# Patient Record
Sex: FEMALE | Race: WHITE | Employment: UNEMPLOYED | URBAN - METROPOLITAN AREA
[De-identification: names, ages, dates, MRNs, and addresses within clinical notes are randomized per-mention and may not be internally consistent; named-entity substitution may affect disease eponyms.]

---

## 2017-10-20 ENCOUNTER — HOSPITAL ENCOUNTER (EMERGENCY)
Age: 82
Discharge: HOME | End: 2017-10-21
Payer: MEDICARE

## 2017-10-20 DIAGNOSIS — S43.014A ANTERIOR DISLOCATION OF RIGHT SHOULDER, INITIAL ENCOUNTER: Primary | ICD-10-CM

## 2017-10-20 PROCEDURE — 99283 EMERGENCY DEPT VISIT LOW MDM: CPT

## 2017-10-20 RX ORDER — HYDROCODONE BITARTRATE AND ACETAMINOPHEN 5; 325 MG/1; MG/1
1 TABLET ORAL ONCE
Status: COMPLETED | OUTPATIENT
Start: 2017-10-20 | End: 2017-10-21

## 2017-10-20 ASSESSMENT — PAIN SCALES - GENERAL: PAINLEVEL_OUTOF10: 7

## 2017-10-20 ASSESSMENT — PAIN DESCRIPTION - LOCATION: LOCATION: SHOULDER

## 2017-10-20 ASSESSMENT — PAIN DESCRIPTION - ONSET: ONSET: SUDDEN

## 2017-10-20 ASSESSMENT — PAIN DESCRIPTION - PAIN TYPE: TYPE: ACUTE PAIN

## 2017-10-20 ASSESSMENT — PAIN DESCRIPTION - ORIENTATION: ORIENTATION: RIGHT

## 2017-10-20 ASSESSMENT — PAIN DESCRIPTION - FREQUENCY: FREQUENCY: INTERMITTENT

## 2017-10-21 ENCOUNTER — APPOINTMENT (OUTPATIENT)
Dept: GENERAL RADIOLOGY | Age: 82
End: 2017-10-21
Payer: MEDICARE

## 2017-10-21 VITALS
HEIGHT: 62 IN | BODY MASS INDEX: 25.76 KG/M2 | OXYGEN SATURATION: 96 % | WEIGHT: 140 LBS | HEART RATE: 100 BPM | SYSTOLIC BLOOD PRESSURE: 140 MMHG | RESPIRATION RATE: 24 BRPM | DIASTOLIC BLOOD PRESSURE: 78 MMHG | TEMPERATURE: 98.2 F

## 2017-10-21 PROCEDURE — 2500000003 HC RX 250 WO HCPCS

## 2017-10-21 PROCEDURE — 73020 X-RAY EXAM OF SHOULDER: CPT

## 2017-10-21 PROCEDURE — 73030 X-RAY EXAM OF SHOULDER: CPT

## 2017-10-21 PROCEDURE — 6370000000 HC RX 637 (ALT 250 FOR IP): Performed by: PERSONAL EMERGENCY RESPONSE ATTENDANT

## 2017-10-21 PROCEDURE — 6360000002 HC RX W HCPCS: Performed by: PERSONAL EMERGENCY RESPONSE ATTENDANT

## 2017-10-21 PROCEDURE — 96374 THER/PROPH/DIAG INJ IV PUSH: CPT

## 2017-10-21 PROCEDURE — 2500000003 HC RX 250 WO HCPCS: Performed by: PERSONAL EMERGENCY RESPONSE ATTENDANT

## 2017-10-21 PROCEDURE — 96375 TX/PRO/DX INJ NEW DRUG ADDON: CPT

## 2017-10-21 PROCEDURE — 96376 TX/PRO/DX INJ SAME DRUG ADON: CPT

## 2017-10-21 RX ORDER — HYDROCODONE BITARTRATE AND ACETAMINOPHEN 5; 325 MG/1; MG/1
1 TABLET ORAL ONCE
Status: COMPLETED | OUTPATIENT
Start: 2017-10-21 | End: 2017-10-21

## 2017-10-21 RX ORDER — MORPHINE SULFATE 4 MG/ML
4 INJECTION, SOLUTION INTRAMUSCULAR; INTRAVENOUS ONCE
Status: COMPLETED | OUTPATIENT
Start: 2017-10-21 | End: 2017-10-21

## 2017-10-21 RX ORDER — MELOXICAM 7.5 MG/1
7.5 TABLET ORAL DAILY
Qty: 10 TABLET | Refills: 0 | Status: ON HOLD | OUTPATIENT
Start: 2017-10-21 | End: 2017-12-17 | Stop reason: HOSPADM

## 2017-10-21 RX ORDER — ONDANSETRON 2 MG/ML
4 INJECTION INTRAMUSCULAR; INTRAVENOUS ONCE
Status: COMPLETED | OUTPATIENT
Start: 2017-10-21 | End: 2017-10-21

## 2017-10-21 RX ORDER — ETOMIDATE 2 MG/ML
INJECTION INTRAVENOUS
Status: COMPLETED
Start: 2017-10-21 | End: 2017-10-21

## 2017-10-21 RX ORDER — MORPHINE SULFATE 2 MG/ML
2 INJECTION, SOLUTION INTRAMUSCULAR; INTRAVENOUS ONCE
Status: COMPLETED | OUTPATIENT
Start: 2017-10-21 | End: 2017-10-21

## 2017-10-21 RX ORDER — HYDROCODONE BITARTRATE AND ACETAMINOPHEN 5; 325 MG/1; MG/1
1 TABLET ORAL EVERY 6 HOURS PRN
Qty: 10 TABLET | Refills: 0 | Status: SHIPPED | OUTPATIENT
Start: 2017-10-21 | End: 2017-10-28

## 2017-10-21 RX ORDER — ETOMIDATE 2 MG/ML
10 INJECTION INTRAVENOUS ONCE
Status: COMPLETED | OUTPATIENT
Start: 2017-10-21 | End: 2017-10-21

## 2017-10-21 RX ADMIN — ONDANSETRON 4 MG: 2 INJECTION INTRAMUSCULAR; INTRAVENOUS at 03:26

## 2017-10-21 RX ADMIN — ETOMIDATE 8 MG: 2 INJECTION INTRAVENOUS at 02:25

## 2017-10-21 RX ADMIN — ETOMIDATE 10 MG: 2 INJECTION INTRAVENOUS at 02:30

## 2017-10-21 RX ADMIN — HYDROCODONE BITARTRATE AND ACETAMINOPHEN 1 TABLET: 5; 325 TABLET ORAL at 03:26

## 2017-10-21 RX ADMIN — HYDROCODONE BITARTRATE AND ACETAMINOPHEN 1 TABLET: 5; 325 TABLET ORAL at 00:01

## 2017-10-21 RX ADMIN — Medication 2 MG: at 03:28

## 2017-10-21 RX ADMIN — ONDANSETRON 4 MG: 2 INJECTION INTRAMUSCULAR; INTRAVENOUS at 01:30

## 2017-10-21 RX ADMIN — Medication 4 MG: at 01:30

## 2017-10-21 ASSESSMENT — ENCOUNTER SYMPTOMS
NAUSEA: 0
COUGH: 0
COLOR CHANGE: 0
VOMITING: 0
DIARRHEA: 0
ABDOMINAL PAIN: 0
RHINORRHEA: 0
SHORTNESS OF BREATH: 0
BLOOD IN STOOL: 0

## 2017-10-21 ASSESSMENT — PAIN SCALES - GENERAL: PAINLEVEL_OUTOF10: 7

## 2017-10-21 NOTE — ED NOTES
Patient taken out to private vehicle in wheel chair with 2 person assist. Tried to apply swing and swathe. Patient agitated and refusing to keep sling and swathe on. No respiratory distress noted.       Megan Pineda, RN  10/21/17 3370

## 2017-10-21 NOTE — ED PROVIDER NOTES
TIME   Total Critical Care time was 0 minutes, excluding separately reportable procedures. There was a high probability of clinically significant/life threatening deterioration in the patient's condition which required my urgent intervention. Procedures    FINAL IMPRESSION      1. Anterior dislocation of right shoulder, initial encounter          DISPOSITION/PLAN   DISPOSITION Discharge - Pending Orders Complete    PATIENT REFERRED TO:  Drea Irving MD  [address]   [address] [place] [address]  [phone]    In 2 days        DISCHARGE MEDICATIONS:  New Prescriptions    HYDROCODONE-ACETAMINOPHEN (NORCO) 5-325 MG PER TABLET    Take 1 tablet by mouth every 6 hours as needed for Pain . MELOXICAM (MOBIC) 7.5 MG TABLET    Take 1 tablet by mouth daily     Attestation: The Prescription Monitoring Report for this patient was reviewed today. (Odette Dickson, [address])    (Please note that portions of this note were completed with a voice recognition program.  Efforts were made to edit the dictations but occasionally words are mis-transcribed. )    FANG Correia (electronically signed)  Emergency Physician [address] [place] [address], [address]  10/21/17 5131

## 2017-10-21 NOTE — ED NOTES
Sling applied after conscious sedation performed. Patient continues to be restless after waking from sedation. Patient vitals continuing to be monitored.  Family at [address], RN  10/21/17 0062

## 2017-10-21 NOTE — ED TRIAGE NOTES
Pt has arthritis in her knees. She was getting ready to go to bed and fell I her bedroom. She denies hitting her head. Denies loc. States she could not get up herself so her  tried to help her, and thinks he may have injured her right shoulder.

## 2017-10-21 NOTE — ED NOTES
Patient placed on monitor, oxygen via 2lpm NC, pulse ox. Patient lying on her left side. Moaning in pain. Portable xray being obtained. Physician aware.       Beth Pineda RN  10/21/17 9195

## 2017-10-21 NOTE — ED NOTES
Patient placed on life michael, as well as monitor, 2L Oxygen via nasal cannula. Resp Therapy at bedside.       [address] K[address] Pineda, RN  10/21/17 0891

## 2017-11-18 ENCOUNTER — APPOINTMENT (OUTPATIENT)
Dept: GENERAL RADIOLOGY | Age: 82
End: 2017-11-18
Payer: MEDICARE

## 2017-11-18 ENCOUNTER — HOSPITAL ENCOUNTER (EMERGENCY)
Age: 82
Discharge: HOME | End: 2017-11-18
Payer: MEDICARE

## 2017-11-18 VITALS
HEART RATE: 69 BPM | BODY MASS INDEX: 24.16 KG/M2 | DIASTOLIC BLOOD PRESSURE: 54 MMHG | SYSTOLIC BLOOD PRESSURE: 138 MMHG | WEIGHT: 145 LBS | HEIGHT: 65 IN | OXYGEN SATURATION: 98 % | RESPIRATION RATE: 17 BRPM | TEMPERATURE: 98.1 F

## 2017-11-18 DIAGNOSIS — S22.43XD MULTIPLE CLOSED FRACTURES OF RIBS OF BOTH SIDES WITH ROUTINE HEALING, SUBSEQUENT ENCOUNTER: Primary | ICD-10-CM

## 2017-11-18 LAB
ALBUMIN SERPL-MCNC: 2.8 G/DL (ref 3.9–4.9)
ALP BLD-CCNC: 155 U/L (ref 40–130)
ALT SERPL-CCNC: 15 U/L (ref 0–33)
ANION GAP SERPL CALCULATED.3IONS-SCNC: 12 MEQ/L (ref 7–13)
APTT: 27.3 SEC (ref 21.6–35.4)
AST SERPL-CCNC: 30 U/L (ref 0–35)
BILIRUB SERPL-MCNC: 2 MG/DL (ref 0–1.2)
BUN BLDV-MCNC: 8 MG/DL (ref 8–23)
CALCIUM SERPL-MCNC: 8.7 MG/DL (ref 8.6–10.2)
CHLORIDE BLD-SCNC: 101 MEQ/L (ref 98–107)
CO2: 26 MEQ/L (ref 22–29)
CREAT SERPL-MCNC: 0.45 MG/DL (ref 0.5–0.9)
GFR AFRICAN AMERICAN: >60
GFR NON-AFRICAN AMERICAN: >60
GLOBULIN: 3 G/DL (ref 2.3–3.5)
GLUCOSE BLD-MCNC: 109 MG/DL (ref 74–109)
HCT VFR BLD CALC: 40.2 % (ref 37–47)
HEMOGLOBIN: 13.5 G/DL (ref 12–16)
INR BLD: 1.2
MCH RBC QN AUTO: 34.3 PG (ref 27–31.3)
MCHC RBC AUTO-ENTMCNC: 33.6 % (ref 33–37)
MCV RBC AUTO: 102.2 FL (ref 82–100)
PDW BLD-RTO: 16.5 % (ref 11.5–14.5)
PLATELET # BLD: 127 K/UL (ref 130–400)
POTASSIUM SERPL-SCNC: 3.8 MEQ/L (ref 3.5–5.1)
PRO-BNP: 713 PG/ML
PROTHROMBIN TIME: 12.7 SEC (ref 8.1–13.7)
RBC # BLD: 3.93 M/UL (ref 4.2–5.4)
SODIUM BLD-SCNC: 139 MEQ/L (ref 132–144)
TOTAL CK: 47 U/L (ref 0–170)
TOTAL PROTEIN: 5.8 G/DL (ref 6.4–8.1)
TROPONIN: <0.01 NG/ML (ref 0–0.01)
WBC # BLD: 4.9 K/UL (ref 4.8–10.8)

## 2017-11-18 PROCEDURE — 85610 PROTHROMBIN TIME: CPT

## 2017-11-18 PROCEDURE — 99285 EMERGENCY DEPT VISIT HI MDM: CPT

## 2017-11-18 PROCEDURE — 71111 X-RAY EXAM RIBS/CHEST4/> VWS: CPT

## 2017-11-18 PROCEDURE — 36415 COLL VENOUS BLD VENIPUNCTURE: CPT

## 2017-11-18 PROCEDURE — 85027 COMPLETE CBC AUTOMATED: CPT

## 2017-11-18 PROCEDURE — 83880 ASSAY OF NATRIURETIC PEPTIDE: CPT

## 2017-11-18 PROCEDURE — 84484 ASSAY OF TROPONIN QUANT: CPT

## 2017-11-18 PROCEDURE — 87040 BLOOD CULTURE FOR BACTERIA: CPT

## 2017-11-18 PROCEDURE — 82550 ASSAY OF CK (CPK): CPT

## 2017-11-18 PROCEDURE — 85730 THROMBOPLASTIN TIME PARTIAL: CPT

## 2017-11-18 PROCEDURE — 80053 COMPREHEN METABOLIC PANEL: CPT

## 2017-11-18 PROCEDURE — 93005 ELECTROCARDIOGRAM TRACING: CPT

## 2017-11-18 RX ORDER — HYDROCODONE BITARTRATE AND ACETAMINOPHEN 5; 325 MG/1; MG/1
1 TABLET ORAL EVERY 6 HOURS PRN
Qty: 20 TABLET | Refills: 0 | Status: SHIPPED | OUTPATIENT
Start: 2017-11-18 | End: 2017-11-25

## 2017-11-18 RX ORDER — MORPHINE SULFATE 4 MG/ML
4 INJECTION, SOLUTION INTRAMUSCULAR; INTRAVENOUS ONCE
Status: DISCONTINUED | OUTPATIENT
Start: 2017-11-18 | End: 2017-11-18 | Stop reason: HOSPADM

## 2017-11-18 RX ORDER — FAMOTIDINE 20 MG/1
20 TABLET, FILM COATED ORAL 2 TIMES DAILY
Status: ON HOLD | COMMUNITY
End: 2017-12-17 | Stop reason: HOSPADM

## 2017-11-18 RX ORDER — ONDANSETRON 2 MG/ML
4 INJECTION INTRAMUSCULAR; INTRAVENOUS ONCE
Status: DISCONTINUED | OUTPATIENT
Start: 2017-11-18 | End: 2017-11-18 | Stop reason: HOSPADM

## 2017-11-18 ASSESSMENT — ENCOUNTER SYMPTOMS
ABDOMINAL DISTENTION: 0
VOMITING: 0
CONSTIPATION: 0
DIARRHEA: 0
SHORTNESS OF BREATH: 0
EYE DISCHARGE: 0
NAUSEA: 0
ABDOMINAL PAIN: 0
COUGH: 0
COLOR CHANGE: 0
BACK PAIN: 0
RECTAL PAIN: 0
RHINORRHEA: 0
SORE THROAT: 0
CHOKING: 0

## 2017-11-18 ASSESSMENT — PAIN DESCRIPTION - PAIN TYPE
TYPE: ACUTE PAIN
TYPE: ACUTE PAIN

## 2017-11-18 ASSESSMENT — PAIN SCALES - GENERAL
PAINLEVEL_OUTOF10: 6

## 2017-11-18 ASSESSMENT — PAIN DESCRIPTION - LOCATION
LOCATION: CHEST
LOCATION: CHEST

## 2017-11-18 ASSESSMENT — PAIN DESCRIPTION - ORIENTATION
ORIENTATION: MID
ORIENTATION: MID

## 2017-11-18 NOTE — ED PROVIDER NOTES
none    LABS:  Labs Reviewed   COMPREHENSIVE METABOLIC PANEL - Abnormal; Notable for the following:        Result Value    CREATININE 0.45 (*)     Total Protein 5.8 (*)     Alb 2.8 (*)     Total Bilirubin 2.0 (*)     Alkaline Phosphatase 155 (*)     All other components within normal limits   CBC - Abnormal; Notable for the following:     RBC 3.93 (*)     .2 (*)     MCH 34.3 (*)     RDW 16.5 (*)     Platelets 705 (*)     All other components within normal limits   CULTURE BLOOD #1   CULTURE BLOOD #2   TROPONIN   CK   BRAIN NATRIURETIC PEPTIDE   PROTIME-INR   APTT   URINE RT REFLEX TO CULTURE       All other labs were within normal range or not returned as of this dictation. EMERGENCY DEPARTMENT COURSE and DIFFERENTIAL DIAGNOSIS/MDM:   Vitals:    Vitals:    11/18/17 1226 11/18/17 1305 11/18/17 1340   BP: (!) 140/74 (!) 142/47 (!) 143/60   Pulse: 75 72 67   Resp: 18 18 20   Temp: 98.1 °F (36.7 °C)     TempSrc: Oral     SpO2: 98% 97% 97%   Weight: 145 lb (65.8 kg)     Height: 5' 4.5\" (1.638 m)           ED Course      MDM  Number of Diagnoses or Management Options  Multiple closed fractures of ribs of both sides with routine healing, subsequent encounter:   Diagnosis management comments: X-ray bilateral ribs show multiple healing of rib fractures from previous fall approximately 3 weeks ago. No signs of pneumothorax patient displays no respiratory distress she was given no Norco for pain control and advised follow-up with her regular family physician in the next 5 days. CRITICAL CARE TIME   Total Critical Care time was 0 minutes, excluding separately reportable procedures. There was a high probability of clinically significant/life threatening deterioration in the patient's condition which required my urgent intervention. CONSULTS:  None    PROCEDURES:  Unless otherwise noted below, none     Procedures    FINAL IMPRESSION      1.  Multiple closed fractures of ribs of both sides with routine healing, subsequent encounter          DISPOSITION/PLAN   DISPOSITION Decision to Discharge    PATIENT REFERRED TO:  Noel Morales MD  [address] [address]  [phone]    In 3 days        DISCHARGE MEDICATIONS:  New Prescriptions    HYDROCODONE-ACETAMINOPHEN (NORCO) 5-325 MG PER TABLET    Take 1 tablet by mouth every 6 hours as needed for Pain .           (Please note that portions of this note were completed with a voice recognition program.  Efforts were made to edit the dictations but occasionally words are mis-transcribed.)    Soham Pisano PA-C (electronically signed)  Attending Emergency Physician         Soham Pisano PA-C  11/18/17 1946

## 2017-11-18 NOTE — ED TRIAGE NOTES
A & Ox4. Skin pink warm and dry. Pt states she dislocated her right shoulder 3 1/2 weeks ago d/t reaching for something and missing it and went forward, hitting right shoulder. States started having mid chest pain about 3 weeks ago, mostly after moving. States also has had vomiting x 3 weeks. States diarrhea x 1 last night and no vomiting today so far and one time vomiting last night as well. Pain with movement getting into bed as well as with palpation. States also has been coughing very little x 3 weeks as well. Denies fevers.

## 2017-11-18 NOTE — ED NOTES
IV attempted right forearm. Site blew as soon as needle touched the vein. Blood culture x 1 set obtained. Pressure bandage applied. Tolerated well.      Nancy Nagy RN  11/18/17 7326

## 2017-11-20 LAB
EKG ATRIAL RATE: 71 BPM
EKG P AXIS: 51 DEGREES
EKG P-R INTERVAL: 162 MS
EKG Q-T INTERVAL: 472 MS
EKG QRS DURATION: 136 MS
EKG QTC CALCULATION (BAZETT): 512 MS
EKG R AXIS: 7 DEGREES
EKG T AXIS: 104 DEGREES
EKG VENTRICULAR RATE: 71 BPM

## 2017-11-23 LAB
BLOOD CULTURE, ROUTINE: NORMAL
CULTURE, BLOOD 2: NORMAL

## 2017-12-14 ENCOUNTER — APPOINTMENT (OUTPATIENT)
Dept: CT IMAGING | Age: 82
DRG: 377 | End: 2017-12-14
Payer: MEDICARE

## 2017-12-14 ENCOUNTER — HOSPITAL ENCOUNTER (INPATIENT)
Age: 82
LOS: 3 days | Discharge: HOME HEALTH | DRG: 377 | End: 2017-12-17
Attending: EMERGENCY MEDICINE | Admitting: HOSPITALIST
Payer: MEDICARE

## 2017-12-14 ENCOUNTER — APPOINTMENT (OUTPATIENT)
Dept: GENERAL RADIOLOGY | Age: 82
DRG: 377 | End: 2017-12-14
Payer: MEDICARE

## 2017-12-14 DIAGNOSIS — R11.2 NAUSEA AND VOMITING, INTRACTABILITY OF VOMITING NOT SPECIFIED, UNSPECIFIED VOMITING TYPE: Primary | ICD-10-CM

## 2017-12-14 DIAGNOSIS — R19.7 DIARRHEA, UNSPECIFIED TYPE: ICD-10-CM

## 2017-12-14 DIAGNOSIS — R53.1 GENERAL WEAKNESS: ICD-10-CM

## 2017-12-14 DIAGNOSIS — E86.0 DEHYDRATION: ICD-10-CM

## 2017-12-14 DIAGNOSIS — K92.1 GASTROINTESTINAL HEMORRHAGE WITH MELENA: ICD-10-CM

## 2017-12-14 PROBLEM — R77.8 ELEVATED TROPONIN: Status: ACTIVE | Noted: 2017-12-14

## 2017-12-14 PROBLEM — E87.20 LACTIC ACIDOSIS: Status: ACTIVE | Noted: 2017-12-14

## 2017-12-14 PROBLEM — K92.2 GI BLEED: Status: ACTIVE | Noted: 2017-12-14

## 2017-12-14 PROBLEM — G93.40 ENCEPHALOPATHY: Status: ACTIVE | Noted: 2017-12-14

## 2017-12-14 PROBLEM — D62 ACUTE BLOOD LOSS ANEMIA: Status: ACTIVE | Noted: 2017-12-14

## 2017-12-14 PROBLEM — R65.10 SIRS (SYSTEMIC INFLAMMATORY RESPONSE SYNDROME) (HCC): Status: ACTIVE | Noted: 2017-12-14

## 2017-12-14 LAB
ABO/RH: NORMAL
ALBUMIN SERPL-MCNC: 2.8 G/DL (ref 3.9–4.9)
ALP BLD-CCNC: 80 U/L (ref 40–130)
ALT SERPL-CCNC: 13 U/L (ref 0–33)
AMORPHOUS: NORMAL
AMYLASE: 42 U/L (ref 28–100)
ANION GAP SERPL CALCULATED.3IONS-SCNC: 16 MEQ/L (ref 9–17)
ANTIBODY SCREEN: NORMAL
AST SERPL-CCNC: 29 U/L (ref 0–35)
BACTERIA: NORMAL /HPF
BASOPHILS ABSOLUTE: 0 K/UL (ref 0–0.2)
BASOPHILS RELATIVE PERCENT: 0.6 %
BILIRUB SERPL-MCNC: 1.9 MG/DL (ref 0–1.2)
BILIRUBIN URINE: ABNORMAL
BLOOD, URINE: NEGATIVE
BUN BLDV-MCNC: 39 MG/DL (ref 8–23)
C-REACTIVE PROTEIN, HIGH SENSITIVITY: 25.5 MG/L (ref 0–5)
CALCIUM SERPL-MCNC: 8.9 MG/DL (ref 8.6–10.2)
CASTS: NORMAL /LPF
CHLORIDE BLD-SCNC: 102 MEQ/L (ref 97–107)
CHP ED QC CHECK: YES
CK MB: 4.3 NG/ML (ref 0–3.8)
CK MB: 4.3 NG/ML (ref 0–3.8)
CLARITY: ABNORMAL
CO2: 26 MEQ/L (ref 17–24)
COLOR: ABNORMAL
CREAT SERPL-MCNC: 0.81 MG/DL (ref 0.5–0.9)
CREATINE KINASE-MB INDEX: 3.4 % (ref 0–3.5)
CREATINE KINASE-MB INDEX: 4.7 % (ref 0–3.5)
EKG ATRIAL RATE: 119 BPM
EKG P AXIS: 75 DEGREES
EKG P-R INTERVAL: 178 MS
EKG Q-T INTERVAL: 336 MS
EKG QRS DURATION: 108 MS
EKG QTC CALCULATION (BAZETT): 472 MS
EKG R AXIS: 35 DEGREES
EKG T AXIS: 166 DEGREES
EKG VENTRICULAR RATE: 119 BPM
EOSINOPHILS ABSOLUTE: 0 K/UL (ref 0–0.7)
EOSINOPHILS RELATIVE PERCENT: 0.1 %
EPITHELIAL CELLS, UA: NORMAL /HPF
GFR AFRICAN AMERICAN: >60
GFR NON-AFRICAN AMERICAN: >60
GLOBULIN: 2.7 G/DL (ref 2.3–3.5)
GLUCOSE BLD-MCNC: 130 MG/DL (ref 74–109)
GLUCOSE URINE: NEGATIVE MG/DL
HCT VFR BLD CALC: 33 % (ref 37–47)
HEMOCCULT STL QL: POSITIVE
HEMOGLOBIN: 10.8 G/DL (ref 12–16)
INR BLD: 1.4
KETONES, URINE: ABNORMAL MG/DL
LACTIC ACID: 5.2 MMOL/L (ref 0.5–2.2)
LEUKOCYTE ESTERASE, URINE: ABNORMAL
LIPASE: 20 U/L (ref 13–60)
LYMPHOCYTES ABSOLUTE: 0.6 K/UL (ref 1–4.8)
LYMPHOCYTES RELATIVE PERCENT: 6.6 %
MCH RBC QN AUTO: 34.2 PG (ref 27–31.3)
MCHC RBC AUTO-ENTMCNC: 32.8 % (ref 33–37)
MCV RBC AUTO: 104.3 FL (ref 82–100)
MONOCYTES ABSOLUTE: 0.7 K/UL (ref 0.2–0.8)
MONOCYTES RELATIVE PERCENT: 8.2 %
NEUTROPHILS ABSOLUTE: 7.3 K/UL (ref 1.4–6.5)
NEUTROPHILS RELATIVE PERCENT: 84.5 %
NITRITE, URINE: NEGATIVE
PDW BLD-RTO: 16.1 % (ref 11.5–14.5)
PH UA: 5 (ref 5–9)
PLATELET # BLD: 160 K/UL (ref 130–400)
POTASSIUM SERPL-SCNC: 4 MEQ/L (ref 3.2–4.4)
PROTEIN UA: 30 MG/DL
PROTHROMBIN TIME: 15 SEC (ref 8.1–13.7)
RBC # BLD: 3.17 M/UL (ref 4.2–5.4)
RBC UA: NORMAL /HPF (ref 0–2)
SEDIMENTATION RATE, ERYTHROCYTE: 16 MM (ref 0–30)
SODIUM BLD-SCNC: 144 MEQ/L (ref 132–138)
SPECIFIC GRAVITY UA: 1.02 (ref 1–1.03)
TOTAL CK: 126 U/L (ref 0–170)
TOTAL CK: 91 U/L (ref 0–170)
TOTAL PROTEIN: 5.5 G/DL (ref 6.4–8.1)
TROPONIN: 0.01 NG/ML (ref 0–0.01)
TROPONIN: <0.01 NG/ML (ref 0–0.01)
TROPONIN: <0.01 NG/ML (ref 0–0.01)
URINE REFLEX TO CULTURE: YES
UROBILINOGEN, URINE: 0.2 E.U./DL
WBC # BLD: 8.7 K/UL (ref 4.8–10.8)
WBC UA: NORMAL /HPF (ref 0–5)

## 2017-12-14 PROCEDURE — 36415 COLL VENOUS BLD VENIPUNCTURE: CPT

## 2017-12-14 PROCEDURE — 84484 ASSAY OF TROPONIN QUANT: CPT

## 2017-12-14 PROCEDURE — 6360000002 HC RX W HCPCS: Performed by: EMERGENCY MEDICINE

## 2017-12-14 PROCEDURE — 96374 THER/PROPH/DIAG INJ IV PUSH: CPT

## 2017-12-14 PROCEDURE — 81001 URINALYSIS AUTO W/SCOPE: CPT

## 2017-12-14 PROCEDURE — 1210000000 HC MED SURG R&B

## 2017-12-14 PROCEDURE — 6370000000 HC RX 637 (ALT 250 FOR IP): Performed by: HOSPITALIST

## 2017-12-14 PROCEDURE — 87086 URINE CULTURE/COLONY COUNT: CPT

## 2017-12-14 PROCEDURE — C9113 INJ PANTOPRAZOLE SODIUM, VIA: HCPCS | Performed by: HOSPITALIST

## 2017-12-14 PROCEDURE — 87040 BLOOD CULTURE FOR BACTERIA: CPT

## 2017-12-14 PROCEDURE — 86901 BLOOD TYPING SEROLOGIC RH(D): CPT

## 2017-12-14 PROCEDURE — 80053 COMPREHEN METABOLIC PANEL: CPT

## 2017-12-14 PROCEDURE — 70450 CT HEAD/BRAIN W/O DYE: CPT

## 2017-12-14 PROCEDURE — 85610 PROTHROMBIN TIME: CPT

## 2017-12-14 PROCEDURE — 86900 BLOOD TYPING SEROLOGIC ABO: CPT

## 2017-12-14 PROCEDURE — 83690 ASSAY OF LIPASE: CPT

## 2017-12-14 PROCEDURE — 99285 EMERGENCY DEPT VISIT HI MDM: CPT

## 2017-12-14 PROCEDURE — 96361 HYDRATE IV INFUSION ADD-ON: CPT

## 2017-12-14 PROCEDURE — 93005 ELECTROCARDIOGRAM TRACING: CPT

## 2017-12-14 PROCEDURE — 86141 C-REACTIVE PROTEIN HS: CPT

## 2017-12-14 PROCEDURE — 82550 ASSAY OF CK (CPK): CPT

## 2017-12-14 PROCEDURE — 6360000002 HC RX W HCPCS: Performed by: HOSPITALIST

## 2017-12-14 PROCEDURE — 2580000003 HC RX 258: Performed by: HOSPITALIST

## 2017-12-14 PROCEDURE — 86850 RBC ANTIBODY SCREEN: CPT

## 2017-12-14 PROCEDURE — 2580000003 HC RX 258: Performed by: EMERGENCY MEDICINE

## 2017-12-14 PROCEDURE — 83605 ASSAY OF LACTIC ACID: CPT

## 2017-12-14 PROCEDURE — 74022 RADEX COMPL AQT ABD SERIES: CPT

## 2017-12-14 PROCEDURE — 82553 CREATINE MB FRACTION: CPT

## 2017-12-14 PROCEDURE — 51702 INSERT TEMP BLADDER CATH: CPT

## 2017-12-14 PROCEDURE — 85025 COMPLETE CBC W/AUTO DIFF WBC: CPT

## 2017-12-14 PROCEDURE — 82150 ASSAY OF AMYLASE: CPT

## 2017-12-14 PROCEDURE — 85652 RBC SED RATE AUTOMATED: CPT

## 2017-12-14 RX ORDER — ONDANSETRON 2 MG/ML
4 INJECTION INTRAMUSCULAR; INTRAVENOUS EVERY 6 HOURS PRN
Status: DISCONTINUED | OUTPATIENT
Start: 2017-12-14 | End: 2017-12-17 | Stop reason: HOSPADM

## 2017-12-14 RX ORDER — MORPHINE SULFATE 2 MG/ML
2 INJECTION, SOLUTION INTRAMUSCULAR; INTRAVENOUS
Status: DISCONTINUED | OUTPATIENT
Start: 2017-12-14 | End: 2017-12-17 | Stop reason: HOSPADM

## 2017-12-14 RX ORDER — ONDANSETRON 4 MG/1
4 TABLET, ORALLY DISINTEGRATING ORAL EVERY 8 HOURS PRN
Status: DISCONTINUED | OUTPATIENT
Start: 2017-12-14 | End: 2017-12-17 | Stop reason: HOSPADM

## 2017-12-14 RX ORDER — ACETAMINOPHEN 325 MG/1
650 TABLET ORAL EVERY 4 HOURS PRN
Status: DISCONTINUED | OUTPATIENT
Start: 2017-12-14 | End: 2017-12-17 | Stop reason: HOSPADM

## 2017-12-14 RX ORDER — SODIUM CHLORIDE 9 MG/ML
INJECTION, SOLUTION INTRAVENOUS CONTINUOUS
Status: DISCONTINUED | OUTPATIENT
Start: 2017-12-14 | End: 2017-12-17 | Stop reason: HOSPADM

## 2017-12-14 RX ORDER — SODIUM CHLORIDE 0.9 % (FLUSH) 0.9 %
10 SYRINGE (ML) INJECTION PRN
Status: DISCONTINUED | OUTPATIENT
Start: 2017-12-14 | End: 2017-12-15 | Stop reason: SDUPTHER

## 2017-12-14 RX ORDER — DOCUSATE SODIUM 100 MG/1
100 CAPSULE, LIQUID FILLED ORAL 2 TIMES DAILY
Status: DISCONTINUED | OUTPATIENT
Start: 2017-12-14 | End: 2017-12-17 | Stop reason: HOSPADM

## 2017-12-14 RX ORDER — 0.9 % SODIUM CHLORIDE 0.9 %
1000 INTRAVENOUS SOLUTION INTRAVENOUS ONCE
Status: COMPLETED | OUTPATIENT
Start: 2017-12-14 | End: 2017-12-14

## 2017-12-14 RX ORDER — SODIUM CHLORIDE 0.9 % (FLUSH) 0.9 %
3 SYRINGE (ML) INJECTION EVERY 8 HOURS
Status: DISCONTINUED | OUTPATIENT
Start: 2017-12-14 | End: 2017-12-17 | Stop reason: HOSPADM

## 2017-12-14 RX ORDER — PANTOPRAZOLE SODIUM 40 MG/10ML
40 INJECTION, POWDER, LYOPHILIZED, FOR SOLUTION INTRAVENOUS 2 TIMES DAILY
Status: DISCONTINUED | OUTPATIENT
Start: 2017-12-14 | End: 2017-12-17 | Stop reason: HOSPADM

## 2017-12-14 RX ORDER — 0.9 % SODIUM CHLORIDE 0.9 %
10 VIAL (ML) INJECTION DAILY
Status: DISCONTINUED | OUTPATIENT
Start: 2017-12-14 | End: 2017-12-17 | Stop reason: HOSPADM

## 2017-12-14 RX ORDER — ACETAMINOPHEN 325 MG/1
650 TABLET ORAL EVERY 4 HOURS PRN
Status: DISCONTINUED | OUTPATIENT
Start: 2017-12-14 | End: 2017-12-14 | Stop reason: SDUPTHER

## 2017-12-14 RX ORDER — SODIUM CHLORIDE 0.9 % (FLUSH) 0.9 %
10 SYRINGE (ML) INJECTION EVERY 12 HOURS SCHEDULED
Status: DISCONTINUED | OUTPATIENT
Start: 2017-12-14 | End: 2017-12-15 | Stop reason: SDUPTHER

## 2017-12-14 RX ORDER — SODIUM CHLORIDE 0.9 % (FLUSH) 0.9 %
10 SYRINGE (ML) INJECTION EVERY 12 HOURS SCHEDULED
Status: DISCONTINUED | OUTPATIENT
Start: 2017-12-14 | End: 2017-12-17 | Stop reason: HOSPADM

## 2017-12-14 RX ORDER — SODIUM CHLORIDE 0.9 % (FLUSH) 0.9 %
10 SYRINGE (ML) INJECTION PRN
Status: DISCONTINUED | OUTPATIENT
Start: 2017-12-14 | End: 2017-12-17 | Stop reason: HOSPADM

## 2017-12-14 RX ADMIN — CEFTRIAXONE 1 G: 1 INJECTION, POWDER, FOR SOLUTION INTRAMUSCULAR; INTRAVENOUS at 04:41

## 2017-12-14 RX ADMIN — PANTOPRAZOLE SODIUM 40 MG: 40 INJECTION, POWDER, FOR SOLUTION INTRAVENOUS at 20:23

## 2017-12-14 RX ADMIN — SODIUM CHLORIDE 1000 ML: 9 INJECTION, SOLUTION INTRAVENOUS at 03:54

## 2017-12-14 RX ADMIN — Medication 2 MG: at 20:23

## 2017-12-14 RX ADMIN — SODIUM CHLORIDE, PRESERVATIVE FREE 10 ML: 5 INJECTION INTRAVENOUS at 20:23

## 2017-12-14 RX ADMIN — Medication 2 MG: at 15:48

## 2017-12-14 RX ADMIN — DOCUSATE SODIUM 100 MG: 100 CAPSULE, LIQUID FILLED ORAL at 20:23

## 2017-12-14 RX ADMIN — PANTOPRAZOLE SODIUM 40 MG: 40 INJECTION, POWDER, FOR SOLUTION INTRAVENOUS at 09:26

## 2017-12-14 RX ADMIN — SODIUM CHLORIDE: 9 INJECTION, SOLUTION INTRAVENOUS at 09:26

## 2017-12-14 RX ADMIN — SODIUM CHLORIDE, PRESERVATIVE FREE 3 ML: 5 INJECTION INTRAVENOUS at 20:23

## 2017-12-14 RX ADMIN — SODIUM CHLORIDE, PRESERVATIVE FREE 10 ML: 5 INJECTION INTRAVENOUS at 09:26

## 2017-12-14 ASSESSMENT — PAIN SCALES - GENERAL
PAINLEVEL_OUTOF10: 0
PAINLEVEL_OUTOF10: 4
PAINLEVEL_OUTOF10: 5

## 2017-12-14 NOTE — ED NOTES
Lab called with critical troponin 0.015, Dr Veronique Melton aware      Nicole Webster, RN  12/14/17 4731

## 2017-12-14 NOTE — PROGRESS NOTES
Pt very confused. Oriented to self only. Pt bathed this morning, covered in black stool from head to toe. Pt calling out for her  who will return shortly. Sitter initiated for patient safety. Will continue to monitor.  Electronically signed by Ramu Camarillo RN on 12/14/2017 at 9:02 AM

## 2017-12-14 NOTE — H&P
with AMS. Pt had diarrhea and nausea for a 24 hour duration. After a fall which resulted in multiple injury 6 weeks ago, she has been forgetful, gets lost, and unable to take care of the house. Her  has taken over all the duties. She is able to walk around. She seems confused. She is not herself according to family members. She is not a smoker nor a diabetic. She has not had recent abdominal surgery. Diarrhea is black in color, vomiting is black in color, she does feel weak at this point and there is no productive cough dysuria frequency chest pain or shortness of breath.      PAST MEDICAL HISTORY:    Past Medical History:   Diagnosis Date    Arthritis      PAST SURGICAL HISTORY:    Past Surgical History:   Procedure Laterality Date    TONSILLECTOMY       FAMILY HISTORY:  History reviewed. No pertinent family history. SOCIAL HISTORY:    Social History     Social History    Marital status:      Spouse name: N/A    Number of children: N/A    Years of education: N/A     Occupational History    Not on file. Social History Main Topics    Smoking status: Former Smoker    Smokeless tobacco: Never Used    Alcohol use 4.2 oz/week     7 Glasses of wine per week      Comment: occasionally    Drug use: No    Sexual activity: Not Currently     Other Topics Concern    Not on file     Social History Narrative    No narrative on file     MEDICATIONS: reviewed    ALLERGIES: Lime flavor [flavoring agent]    REVIEW OF SYSTEM:   ROS as noted in HPI, 12 point ROS reviewed and otherwise negative.     OBJECTIVE  PHYSICAL EXAM: BP (!) 125/33   Pulse 98   Temp 97.9 °F (36.6 °C)   Resp 20   Ht 5' 4\" (1.626 m)   Wt 130 lb (59 kg)   SpO2 95%   BMI 22.31 kg/m²   CONSTITUTIONAL:  awake, alert, confused  EYES:  Lids and lashes normal, pupils equal, round and reactive to light, extra ocular muscles intact, sclera clear, conjunctiva normal  LUNGS:  No increased work of breathing, good air exchange,

## 2017-12-14 NOTE — ED NOTES
[facility] at bedside drawing all labs. I straight cathed pt to obtain urine. Patient tolerated fairly well.       Roly Kunz RN  12/14/17 7057

## 2017-12-14 NOTE — ED PROVIDER NOTES
[address] [facility] ED  eMERGENCY dEPARTMENT eNCOUnter      Pt Name: Jena Mendosa  MRN: [id number]  [place] 1935  Date of evaluation: 12/14/2017  Provider: Omar Win MD    CHIEF COMPLAINT       Chief Complaint   Patient presents with    Emesis     and diarrhea, recent confusion, weakness         HISTORY OF PRESENT ILLNESS   (Location/Symptom, Timing/Onset, Context/Setting, Quality, Duration, Modifying Factors, Severity)  Note limiting factors. Jena Mendosa is a 81 y.o. female who presents to the emergency department  With diarrhea and nausea of 24 hour duration. After a fall which resulted in multiple injury 6 weeks ago, she has been forgetful, gets lost, and unable to take care of the house. Her  has taken over all the duties. She is able to walk around. She seems confused. She is not herself according to family members. She is not a smoker nor diabetic. She has not had recent abdominal surgery. Diarrhea is black in color, vomiting is black in color, she does feel weak at this point there is no productive cough dysuria frequency chest pain or shortness of breath. She is not on any blood thinners    HPI    Nursing Notes were reviewed. REVIEW OF SYSTEMS    (2-9 systems for level 4, 10 or more for level 5)     Review of Systems     Constitutional symptoms:  no Fatigue, no fever, no chills. Skin symptoms:  Negative except as documented in HPI. ENMT symptoms:  Negative except as documented in HPI. Respiratory symptoms:  Negative except as documented in HPI. No cough  Cardiovascular symptoms:  Negative except as documented in HPI. Gastrointestinal symptoms: Negative except for documented as above in the HPI, positive for nausea vomiting diarrhea, black stools black vomitus   Genitourinary symptoms:  Negative except as documented in HPI. Musculoskeletal symptoms:  Negative except as documented in HPI. Neurologic symptoms:  Negative except as documented in HPI. Remainder of 10 systems, all negative except for mentioned above      Except as noted above the remainder of the review of systems was reviewed and negative. PAST MEDICAL HISTORY     Past Medical History:   Diagnosis Date    Arthritis          SURGICAL HISTORY       Past Surgical History:   Procedure Laterality Date    TONSILLECTOMY           CURRENT MEDICATIONS       Previous Medications    FAMOTIDINE (PEPCID) 20 MG TABLET    Take 20 mg by mouth 2 times daily    MELOXICAM (MOBIC) 7.5 MG TABLET    Take 1 tablet by mouth daily       ALLERGIES     Lime flavor [flavoring agent]    FAMILY HISTORY     History reviewed. No pertinent family history. SOCIAL HISTORY       Social History     Social History    Marital status:      Spouse name: N/A    Number of children: N/A    Years of education: N/A     Social History Main Topics    Smoking status: Former Smoker    Smokeless tobacco: Never Used    Alcohol use 4.2 oz/week     7 Glasses of wine per week      Comment: occasionally    Drug use: No    Sexual activity: Not Currently     Other Topics Concern    None     Social History Narrative    None       SCREENINGS    [place] Coma Scale  Eye Opening: Spontaneous  Best Verbal Response: Confused  Best Motor Response: Obeys commands  [place] Coma Scale Score: 14        PHYSICAL EXAM    (up to 7 for level 4, 8 or more for level 5)     ED Triage Vitals [12/14/17 0326]   BP Temp Temp Source Pulse Resp SpO2 Height Weight   (!) 112/48 98.2 °F (36.8 °C) Oral 112 28 100 % 5' 4\" (1.626 m) 130 lb (59 kg)       Physical Exam     CONST: -Well-developed well-nourished ;                -In no acute distress. -Vitals reviewed. EYES: -EOM intact, ROBERT:              -Sclera normal and conjunctiva: clear bilaterally. ENT: - Normal pharynx pink and moist.    NECK: -Supple (chin-to-chest).     CARD: -Rate and rhythm: Regular              -Murmurs: No  RESP: -Respiratory effort and chest excursion with respirations: Normal             -Breath sounds equal bilaterally: Clear             -Wheezes: No             -Rales: No    BACK: -Flank pain: No              -Pain on palpation: No    ABD: -Distended: No           -Bruits: No           -Bowel sounds: Normal.           -Deep palpation: Non-tender           -Organomegaly palpable: No           -Abnormal masses: No    EXT: Gross appearance and use of all four extremities: Normal     SKIN: -Good turgor warm and dry. -Apparent lesions or rashes: No    NEURO: -Patient: alert               She attempts to answer orientation questions to the best of her ability, cannot recall the year, knew that she was in a doctor's office of some sort but not which hospital she was in. She knew her name and her 's name. She follows simple commands only there is no focality or pupillary disturbance        DIAGNOSTIC RESULTS     EKG: All EKG's are interpreted by the Emergency Department Physician who either signs or Co-signs this chart in the absence of a cardiologist.    EKG was revewed  and revealed normal sinus rhythm, rate is 122 no acute ST elevations,no flipped T waves , no Q waves. KS interval is normal.QRS duration is normal. Axes are normal. There are no PVCs    RADIOLOGY:   Non-plain film images such as CT, Ultrasound and MRI are read by the radiologist. Plain radiographic images are visualized and preliminarily interpreted by the emergency physician with the below findings:      Black emesis is noted, guaiac positive emesis guaiac positive stool black and not bright red. Although hematocrit and hemoglobin are not severely compromised at this juncture. CT scan of the brain is without obvious pathology. KUB is normal and there is no obvious urinary tract infection. She is very weak however and with GI bleeding, I believe admission is in order here.   Interpretation per the Radiologist below, if available at the time of this note:    CT Head WO Contrast (Results Pending)   XR Acute Abd Series Chest 1 VW    (Results Pending)         ED BEDSIDE ULTRASOUND:   Performed by ED Physician - none    LABS:  Labs Reviewed   PROTIME-INR - Abnormal; Notable for the following:        Result Value    Protime 15.0 (*)     All other components within normal limits   COMPREHENSIVE METABOLIC PANEL - Abnormal; Notable for the following:     Sodium 144 (*)     CO2 26 (*)     Glucose 130 (*)     BUN 39 (*)     Total Protein 5.5 (*)     Alb 2.8 (*)     Total Bilirubin 1.9 (*)     All other components within normal limits   CBC WITH AUTO DIFFERENTIAL - Abnormal; Notable for the following:     RBC 3.17 (*)     Hemoglobin 10.8 (*)     Hematocrit 33.0 (*)     .3 (*)     MCH 34.2 (*)     MCHC 32.8 (*)     RDW 16.1 (*)     Neutrophils # 7.3 (*)     Lymphocytes # 0.6 (*)     All other components within normal limits   LACTIC ACID, PLASMA - Abnormal; Notable for the following:     Lactic Acid 5.2 (*)     All other components within normal limits    Narrative:     CALL  [facility]  [facility] tel. [phone],  lacid results called to and read back by adonis ruvalcaba, 12/14/2017 04:28, by  [facility]   TROPONIN - Abnormal; Notable for the following:     Troponin 0.015 (*)     All other components within normal limits    Narrative:     Dhaval Cedillo tel. [phone],  trop results called to and read back by Cayetano Dodd, 12/14/2017 04:30, by  [facility]   URINE RT REFLEX TO CULTURE - Abnormal; Notable for the following:     Color, UA DORI (*)     Clarity, UA CLOUDY (*)     Bilirubin Urine SMALL (*)     Ketones, Urine TRACE (*)     Protein, UA 30 (*)     Leukocyte Esterase, Urine TRACE (*)     All other components within normal limits   POCT OCCULT BLOOD STOOL NON CA SCREEN - Normal   CULTURE BLOOD #1   CULTURE BLOOD #2   URINE CULTURE   LIPASE   AMYLASE   MICROSCOPIC URINALYSIS   TYPE AND SCREEN       All other labs were within normal range or not returned as of this dictation.     EMERGENCY DEPARTMENT

## 2017-12-14 NOTE — ED NOTES
Bed: 12  Expected date: 12/14/17  Expected time: 2:58 AM  Means of arrival: [facility]  Comments:  82f from home n/v/d for 6 weeks , having increased confusion tonight, 136/58, st 120, rr 22, placed on 4l o2 nc, ot 227, iv established     Musa Lindo RN  12/14/17 7405

## 2017-12-14 NOTE — CARE COORDINATION
Pt currently confused. Met with  at bedside. He confirmed that pt had become increasingly confused after a fall about 6 weeks ago with a dislocated shoulder and broken ribs. She had been walking with a walker previously.  [facility] but that a nurse practitioner had come over once. He was not sure if it was Palliative Care.  states they have 2 children, 1 in [id number] and the other in [facility]. Discussed probable need for Rehab/SNF at [place].  states his first choice is [facility]. He was given a SNF list to review for a second choice. Will need PT/OT evals when indicated. Spouse would like 2nd choice to be [facility].

## 2017-12-14 NOTE — ED TRIAGE NOTES
Pt in by squad,  reports she has had occasional diarrhea and vomiting, her  reports stool and emesis has been black,  also reports she fell about 6 weeks ago and has seemed confused ever since the fall. Pt denies any pain.

## 2017-12-15 ENCOUNTER — APPOINTMENT (OUTPATIENT)
Dept: MRI IMAGING | Age: 82
DRG: 377 | End: 2017-12-15
Payer: MEDICARE

## 2017-12-15 LAB
AMMONIA: 21 UMOL/L (ref 11–51)
ANION GAP SERPL CALCULATED.3IONS-SCNC: 9 MEQ/L (ref 7–13)
BASOPHILS ABSOLUTE: 0 K/UL (ref 0–0.2)
BASOPHILS RELATIVE PERCENT: 0.9 %
BUN BLDV-MCNC: 21 MG/DL (ref 8–23)
CALCIUM SERPL-MCNC: 7.7 MG/DL (ref 8.6–10.2)
CHLORIDE BLD-SCNC: 111 MEQ/L (ref 98–107)
CK MB: 3.8 NG/ML (ref 0–3.8)
CO2: 24 MEQ/L (ref 22–29)
CREAT SERPL-MCNC: 0.49 MG/DL (ref 0.5–0.9)
CREATINE KINASE-MB INDEX: 3.5 % (ref 0–3.5)
EOSINOPHILS ABSOLUTE: 0.1 K/UL (ref 0–0.7)
EOSINOPHILS RELATIVE PERCENT: 4 %
FOLATE: 5.3 NG/ML (ref 7.3–26.1)
GFR AFRICAN AMERICAN: >60
GFR NON-AFRICAN AMERICAN: >60
GLUCOSE BLD-MCNC: 84 MG/DL (ref 74–109)
HCT VFR BLD CALC: 22.2 % (ref 37–47)
HCT VFR BLD CALC: 25.7 % (ref 37–47)
HEMOGLOBIN: 7.2 G/DL (ref 12–16)
HEMOGLOBIN: 8.2 G/DL (ref 12–16)
LACTIC ACID: 1 MMOL/L (ref 0.5–2.2)
LYMPHOCYTES ABSOLUTE: 0.5 K/UL (ref 1–4.8)
LYMPHOCYTES RELATIVE PERCENT: 19.6 %
MAGNESIUM: 1.6 MG/DL (ref 1.7–2.3)
MCH RBC QN AUTO: 34.3 PG (ref 27–31.3)
MCHC RBC AUTO-ENTMCNC: 32.4 % (ref 33–37)
MCV RBC AUTO: 106.1 FL (ref 82–100)
MONOCYTES ABSOLUTE: 0.3 K/UL (ref 0.2–0.8)
MONOCYTES RELATIVE PERCENT: 10.4 %
NEUTROPHILS ABSOLUTE: 1.8 K/UL (ref 1.4–6.5)
NEUTROPHILS RELATIVE PERCENT: 65.1 %
PDW BLD-RTO: 16.3 % (ref 11.5–14.5)
PLATELET # BLD: 81 K/UL (ref 130–400)
PLATELET SLIDE REVIEW: ABNORMAL
POTASSIUM SERPL-SCNC: 3.3 MEQ/L (ref 3.5–5.1)
RBC # BLD: 2.09 M/UL (ref 4.2–5.4)
SLIDE REVIEW: ABNORMAL
SODIUM BLD-SCNC: 144 MEQ/L (ref 132–144)
TOTAL CK: 110 U/L (ref 0–170)
TROPONIN: 0.01 NG/ML (ref 0–0.01)
TSH SERPL DL<=0.05 MIU/L-ACNC: 3.78 UIU/ML (ref 0.27–4.2)
VITAMIN B-12: 938 PG/ML (ref 211–946)
WBC # BLD: 2.7 K/UL (ref 4.8–10.8)

## 2017-12-15 PROCEDURE — 6360000002 HC RX W HCPCS: Performed by: HOSPITALIST

## 2017-12-15 PROCEDURE — 82553 CREATINE MB FRACTION: CPT

## 2017-12-15 PROCEDURE — 86592 SYPHILIS TEST NON-TREP QUAL: CPT

## 2017-12-15 PROCEDURE — 73221 MRI JOINT UPR EXTREM W/O DYE: CPT

## 2017-12-15 PROCEDURE — 84484 ASSAY OF TROPONIN QUANT: CPT

## 2017-12-15 PROCEDURE — 82746 ASSAY OF FOLIC ACID SERUM: CPT

## 2017-12-15 PROCEDURE — 2580000003 HC RX 258: Performed by: HOSPITALIST

## 2017-12-15 PROCEDURE — 82550 ASSAY OF CK (CPK): CPT

## 2017-12-15 PROCEDURE — 82607 VITAMIN B-12: CPT

## 2017-12-15 PROCEDURE — 6360000002 HC RX W HCPCS: Performed by: SPECIALIST

## 2017-12-15 PROCEDURE — 36415 COLL VENOUS BLD VENIPUNCTURE: CPT

## 2017-12-15 PROCEDURE — 84443 ASSAY THYROID STIM HORMONE: CPT

## 2017-12-15 PROCEDURE — 1210000000 HC MED SURG R&B

## 2017-12-15 PROCEDURE — 87493 C DIFF AMPLIFIED PROBE: CPT

## 2017-12-15 PROCEDURE — 85014 HEMATOCRIT: CPT

## 2017-12-15 PROCEDURE — 80048 BASIC METABOLIC PNL TOTAL CA: CPT

## 2017-12-15 PROCEDURE — 2580000003 HC RX 258: Performed by: SPECIALIST

## 2017-12-15 PROCEDURE — 83735 ASSAY OF MAGNESIUM: CPT

## 2017-12-15 PROCEDURE — 83605 ASSAY OF LACTIC ACID: CPT

## 2017-12-15 PROCEDURE — 6370000000 HC RX 637 (ALT 250 FOR IP): Performed by: HOSPITALIST

## 2017-12-15 PROCEDURE — 2580000003 HC RX 258: Performed by: EMERGENCY MEDICINE

## 2017-12-15 PROCEDURE — 85018 HEMOGLOBIN: CPT

## 2017-12-15 PROCEDURE — 70551 MRI BRAIN STEM W/O DYE: CPT

## 2017-12-15 PROCEDURE — 85025 COMPLETE CBC W/AUTO DIFF WBC: CPT

## 2017-12-15 PROCEDURE — 82140 ASSAY OF AMMONIA: CPT

## 2017-12-15 PROCEDURE — C9113 INJ PANTOPRAZOLE SODIUM, VIA: HCPCS | Performed by: HOSPITALIST

## 2017-12-15 RX ORDER — POTASSIUM CHLORIDE 20 MEQ/1
40 TABLET, EXTENDED RELEASE ORAL ONCE
Status: COMPLETED | OUTPATIENT
Start: 2017-12-15 | End: 2017-12-15

## 2017-12-15 RX ORDER — MAGNESIUM SULFATE IN WATER 40 MG/ML
2 INJECTION, SOLUTION INTRAVENOUS ONCE
Status: COMPLETED | OUTPATIENT
Start: 2017-12-15 | End: 2017-12-15

## 2017-12-15 RX ORDER — OCTREOTIDE ACETATE 50 UG/ML
50 INJECTION, SOLUTION INTRAVENOUS; SUBCUTANEOUS ONCE
Status: COMPLETED | OUTPATIENT
Start: 2017-12-15 | End: 2017-12-15

## 2017-12-15 RX ORDER — LORAZEPAM 2 MG/ML
1 INJECTION INTRAMUSCULAR
Status: ACTIVE | OUTPATIENT
Start: 2017-12-15 | End: 2017-12-15

## 2017-12-15 RX ORDER — 0.9 % SODIUM CHLORIDE 0.9 %
250 INTRAVENOUS SOLUTION INTRAVENOUS ONCE
Status: DISCONTINUED | OUTPATIENT
Start: 2017-12-15 | End: 2017-12-17 | Stop reason: HOSPADM

## 2017-12-15 RX ADMIN — DOCUSATE SODIUM 100 MG: 100 CAPSULE, LIQUID FILLED ORAL at 11:10

## 2017-12-15 RX ADMIN — SODIUM CHLORIDE, PRESERVATIVE FREE 3 ML: 5 INJECTION INTRAVENOUS at 22:43

## 2017-12-15 RX ADMIN — SODIUM CHLORIDE, PRESERVATIVE FREE 10 ML: 5 INJECTION INTRAVENOUS at 22:42

## 2017-12-15 RX ADMIN — SODIUM CHLORIDE: 9 INJECTION, SOLUTION INTRAVENOUS at 13:36

## 2017-12-15 RX ADMIN — DOCUSATE SODIUM 100 MG: 100 CAPSULE, LIQUID FILLED ORAL at 22:41

## 2017-12-15 RX ADMIN — PANTOPRAZOLE SODIUM 40 MG: 40 INJECTION, POWDER, FOR SOLUTION INTRAVENOUS at 22:42

## 2017-12-15 RX ADMIN — PANTOPRAZOLE SODIUM 40 MG: 40 INJECTION, POWDER, FOR SOLUTION INTRAVENOUS at 11:10

## 2017-12-15 RX ADMIN — POTASSIUM CHLORIDE 40 MEQ: 20 TABLET, EXTENDED RELEASE ORAL at 13:36

## 2017-12-15 RX ADMIN — SODIUM CHLORIDE, PRESERVATIVE FREE 10 ML: 5 INJECTION INTRAVENOUS at 11:08

## 2017-12-15 RX ADMIN — MAGNESIUM SULFATE HEPTAHYDRATE 2 G: 40 INJECTION, SOLUTION INTRAVENOUS at 13:36

## 2017-12-15 RX ADMIN — OCTREOTIDE ACETATE 50 MCG: 50 INJECTION, SOLUTION INTRAVENOUS; SUBCUTANEOUS at 21:32

## 2017-12-15 RX ADMIN — OCTREOTIDE ACETATE 50 MCG/HR: 500 INJECTION, SOLUTION INTRAVENOUS; SUBCUTANEOUS at 22:41

## 2017-12-15 NOTE — PROGRESS NOTES
INPATIENT PROGRESS NOTE    SERVICE DATE:  12/15/2017   SERVICE TIME:  13:03    ASSESSMENT AND PLAN   Reji Dickerson is an 81-year-old female with past medical history of alcoholic liver disease, brought in by  due to confusion, and diarrhea with black stools. Admitted with metabolic encephalopathy,SIRS, melena concerning for GI bleed, dehydration, and elevated troponins.     GI Bleed: Pt with melana and reportedly vomiting blood as well. Stool occult is positive. Acute blood loss anemia noted. Likely caused by NSAID use. - Initiated IV protonix BID, continue  - GI consulted. Awaiting eval and recs  - Monitor hgb  - Continue to monitor closely     Acute blood loss anemia: Likely secondary to upper GI bleed as above. Pt with a 3.5 gram drop in hgb overnight!  - Telemetry  - Type and screen  - STAT recheck of hgb  - Transfuse 2 units PRBC's if hgb less than 7.0  - Continue Rx as above  - Continue to monitor closely  - Awaiting GI input     SIRS: Likely secondary to the above  - blood cultures were done and results are pending, urine culture pending  - Given dose of Rocephin in ER  - Checked ESR, CRP  - Recheck CBC     Acute Metabolic Encephalopathy: Likely secondary to the above. Family reports pts mental status altered since last discharge from the hospital.   - Neurochecks  - Continue Rx as above  - Telemetry  - Consult neurology: Chase eval and recs  - Continue to monitor     Dehydration  -IV fluids     Elevated troponin: Trended down  - Telemetry  - Serial troponins, and CKMB ordered     Dispo: Await hgb stabilization and consultant eval and clearance prior to discharge. Advised F/U with PCP 1 - 2 days of discharge.        SUBJECTIVE  CHIEF COMPLAINT: AMS, Melena    PRIMARY SERVICE: Medicine    INTERVAL HPI: Pt says she still feels weak and is anxious about melanotic stools. Denies CP, SOB.      MEDICATIONS:    Current Facility-Administered Medications   Medication Dose Route Frequency Provider Last Rate Last Dose    magnesium sulfate 2 g in 50 mL IVPB premix  2 g Intravenous Once Anna Witt MD        0.9 % sodium chloride infusion 250 mL  250 mL Intravenous Once Anna Witt MD        potassium chloride (KLOR-CON M) extended release tablet 40 mEq  40 mEq Oral Once Anan Witt MD        sodium chloride flush 0.9 % injection 3 mL  3 mL Intravenous Mary Carmen Cuellar MD   3 mL at 12/14/17 2023    sodium chloride flush 0.9 % injection 10 mL  10 mL Intravenous 2 times per day Omar Win MD   10 mL at 12/14/17 2023    sodium chloride flush 0.9 % injection 10 mL  10 mL Intravenous PRN Omar Win MD        ondansetron (ZOFRAN-ODT) disintegrating tablet 4 mg  4 mg Oral Q8H PRN Omar Win MD        morphine injection 2 mg  2 mg Intravenous Q2H PRN Omar Win MD   2 mg at 12/14/17 2023    0.9 % sodium chloride infusion   Intravenous Continuous Anna Witt MD 75 mL/hr at 12/14/17 0926      sodium chloride flush 0.9 % injection 10 mL  10 mL Intravenous 2 times per day Anna Witt MD   10 mL at 12/14/17 2023    sodium chloride flush 0.9 % injection 10 mL  10 mL Intravenous PRN Anna Witt MD        acetaminophen (TYLENOL) tablet 650 mg  650 mg Oral Q4H PRN Anna Witt MD        docusate sodium (COLACE) capsule 100 mg  100 mg Oral BID Anna Witt MD   100 mg at 12/15/17 1110    ondansetron (ZOFRAN) injection 4 mg  4 mg Intravenous Q6H PRN Anna Witt MD        enoxaparin (LOVENOX) injection 40 mg  40 mg Subcutaneous Daily Anna Witt MD   Stopped at 12/14/17 0915    pantoprazole (PROTONIX) injection 40 mg  40 mg Intravenous BID Anna Witt MD   40 mg at 12/15/17 1110    And    sodium chloride (PF) 0.9 % injection 10 mL  10 mL Intravenous Daily Anna Witt MD   10 mL at 12/15/17 1108       OBJECTIVE  PHYSICAL EXAM:   BP (!) 140/45   Pulse 89   Temp 97.7 °F (36.5 °C)   Resp 19   Ht 5' 4\" (1.626 m)   Wt 127 lb (57.6 kg)   SpO2 95%   BMI 21.80 kg/m²   Body mass index is 21.8 kg/m². CONSTITUTIONAL:  awake, alert, confused  EYES:  Lids and lashes normal, pupils equal, round and reactive to light, extra ocular muscles intact, sclera clear, conjunctiva normal  LUNGS:  No increased work of breathing, good air exchange, clear to auscultation bilaterally, no crackles or wheezing  CARDIOVASCULAR:  Normal apical impulse, regular rate and rhythm, normal S1 and S2, no S3 or S4, and no murmur noted  ABDOMEN:  No scars, normal bowel sounds, soft, non-distended, non-tender, no masses palpated, no hepatosplenomegally  EXTREMITIES: No clubbing, no cyanosis, no edema    DATA:   Diagnostic tests reviewed for today's visit:    Most recent labs and imaging results reviewed.      SIGNATURE: Jimbo Del Angel MD PATIENT NAME: Lor Alvarado   DATE: December 15, 2017 MRN: [id number]   TIME: 1:03 PM PAGER: [phone]

## 2017-12-15 NOTE — PROGRESS NOTES
[facility] Occupational Therapy      Date: 12/15/2017  Patient Name: Phillip Nieves        MRN: [id number]  Account: [de-identified]   : 1935  (81 y.o.)  Room: [address] [id number]    Chart reviewed, attempted OT tx at 2:48 PM, but pt. unavailable 2° to:    [x] Hold per nsg request. Decrease hemoglobin. [] Pt declined     [] Pt. . off floor for test/procedure. Will attempt again when able.     Electronically signed by SERENA Velasquez/L on  at 2:48 PM

## 2017-12-15 NOTE — PROGRESS NOTES
Nutrition Assessment    Type and Reason for Visit: Initial, Positive Nutrition Screen, Consult (poor po, wt loss, + malnutriton screen)    Nutrition Recommendations: Continue current diet, Start ONS  Continue with General diet, add High Calorie ONS, Clear ONS ,  Frozen ONS and f/u for acceptance    Malnutrition Assessment:  · Malnutrition Status: At risk for malnutrition  · Context: Acute illness or injury  · Findings of the 6 clinical characteristics of malnutrition (Minimum of 2 out of 6 clinical characteristics is required to make the diagnosis of moderate or severe Protein Calorie Malnutrition based on AND/ASPEN Guidelines):  1. Energy Intake-Less than or equal to 50%, greater than or equal to 1 month    2. Weight Loss-10% loss or greater, in 1 month  3. Fat Loss-No significant subcutaneous fat loss,    4. Muscle Loss-No significant muscle mass loss,    5. Fluid Accumulation-No significant fluid accumulation,    6.  Strength-Normal    Nutrition Diagnosis:   · Problem: Unintended weight loss  · Etiology: related to Cognitive or neurological impairment, Alteration in GI function, Insufficient energy/nutrient consumption     Signs and symptoms:  as evidenced by Diet history of poor intake, Intake 0-25%, Weight loss greater than or equal to 5% in 1 month    Nutrition Assessment:  · Subjective Assessment: Pt admitted from home with . C/O N/D x 24 hr PTA, r/o GIB. Per 'Notes' pt had a fall ~ 6 weeks ago and has had an overall decline in function since, Increased confusion, inability to thoroughly care for herself, .  at bedside at time of visit. States her apppeite has been poor ~ 1 month, states she mostly eats tea and cookies or muffins. States she has not tried ONS, but he states, \" i tried them once and didn't lilke them, I doubt she'll take them\". Pt denies any chewing or swallowing problems. Observed lunch tray at bedside, 0% eaten, pt states , ' not hungry'.  Awaiting GI

## 2017-12-15 NOTE — CONSULTS
Consult to Neurology  Consult performed by: Keiko Morgan ordered by: Enrique Bridges 89 unwitnessed fall  Hist not reliable  CVA possible, fell 6 weeks ago  Right rotator cuff injury and ribs fx  MRI brain and Right Shoulder

## 2017-12-15 NOTE — PROGRESS NOTES
Pt resting in bed. A&Ox1. H&H dropped from 108 and 330.0 yesterday to 7.2 and 22.2 today. Page out to Dr. Mehdi Dumont to notify. In report RN said yesterday pt had 2 black tarry stools today and occult stool was postive in ED. Stool today is dark green.  Electronically signed by Redge Boast, RN on 12/15/2017 at 11:19 AM

## 2017-12-15 NOTE — PROGRESS NOTES
Physical Therapy   [facility] Z[facility]N093-12    NAME: Hassie Meigs    : 1935 (81 y.o.)  MRN: [id number]    Account: [de-identified]  Gender: female    PT evaluation and treatment orders received. Chart reviewed. PT eval attempted. Hold PT eval d/t low Hgb (7.2) down trending compared to yesterday. Will attempt PT evaluation again at earliest convenience.         Electronically signed by Gilbert Rayo PT on 12/15/17 at 9:41 AM

## 2017-12-15 NOTE — PLAN OF CARE
Problem: Nutrition  Goal: Optimal nutrition therapy  Outcome: Ongoing  Nutrition Problem: Unintended weight loss  Intervention: Food and/or Nutrient Delivery: Continue current diet, Start ONS (Continue with General diet, add High Calorie ONS, Clear ONS ,  Frozen ONS and f/u for acceptance)  Nutritional Goals: po > 25% meals and supplements, maintatin wt > 127#

## 2017-12-16 ENCOUNTER — ANESTHESIA EVENT (OUTPATIENT)
Dept: OPERATING ROOM | Age: 82
DRG: 377 | End: 2017-12-16
Payer: MEDICARE

## 2017-12-16 ENCOUNTER — ANESTHESIA (OUTPATIENT)
Dept: OPERATING ROOM | Age: 82
DRG: 377 | End: 2017-12-16
Payer: MEDICARE

## 2017-12-16 VITALS
RESPIRATION RATE: 25 BRPM | SYSTOLIC BLOOD PRESSURE: 167 MMHG | OXYGEN SATURATION: 98 % | DIASTOLIC BLOOD PRESSURE: 88 MMHG

## 2017-12-16 LAB
ANION GAP SERPL CALCULATED.3IONS-SCNC: 11 MEQ/L (ref 7–13)
ANISOCYTOSIS: ABNORMAL
ATYPICAL LYMPHOCYTE RELATIVE PERCENT: 6 %
BANDED NEUTROPHILS RELATIVE PERCENT: 3 % (ref 5–11)
BASOPHILS ABSOLUTE: 0.1 K/UL (ref 0–0.2)
BASOPHILS RELATIVE PERCENT: 3 %
BUN BLDV-MCNC: 13 MG/DL (ref 8–23)
CALCIUM SERPL-MCNC: 7.8 MG/DL (ref 8.6–10.2)
CHLORIDE BLD-SCNC: 108 MEQ/L (ref 98–107)
CLOSTRIDIUM DIFFICILE DNA AMPLIFICATION: NORMAL
CO2: 20 MEQ/L (ref 22–29)
CREAT SERPL-MCNC: 0.52 MG/DL (ref 0.5–0.9)
EOSINOPHILS ABSOLUTE: 0.1 K/UL (ref 0–0.7)
EOSINOPHILS RELATIVE PERCENT: 3 %
GFR AFRICAN AMERICAN: >60
GFR NON-AFRICAN AMERICAN: >60
GLUCOSE BLD-MCNC: 129 MG/DL (ref 74–109)
HCT VFR BLD CALC: 25.1 % (ref 37–47)
HEMOGLOBIN: 8 G/DL (ref 12–16)
HYPOCHROMIA: 0
LYMPHOCYTES ABSOLUTE: 0.2 K/UL (ref 1–4.8)
LYMPHOCYTES RELATIVE PERCENT: 2 %
MACROCYTES: 0
MAGNESIUM: 1.9 MG/DL (ref 1.7–2.3)
MCH RBC QN AUTO: 34.2 PG (ref 27–31.3)
MCHC RBC AUTO-ENTMCNC: 31.9 % (ref 33–37)
MCV RBC AUTO: 107.2 FL (ref 82–100)
MICROCYTES: 0
MONOCYTES ABSOLUTE: 0.2 K/UL (ref 0.2–0.8)
MONOCYTES RELATIVE PERCENT: 6.2 %
NEUTROPHILS ABSOLUTE: 2 K/UL (ref 1.4–6.5)
NEUTROPHILS RELATIVE PERCENT: 76 %
PDW BLD-RTO: 16.6 % (ref 11.5–14.5)
PLATELET # BLD: 92 K/UL (ref 130–400)
PLATELET SLIDE REVIEW: ABNORMAL
POIKILOCYTES: ABNORMAL
POLYCHROMASIA: 0
POTASSIUM SERPL-SCNC: 4.2 MEQ/L (ref 3.5–5.1)
RBC # BLD: 2.35 M/UL (ref 4.2–5.4)
SMUDGE CELLS: 2.1
SODIUM BLD-SCNC: 139 MEQ/L (ref 132–144)
URINE CULTURE, ROUTINE: NORMAL
WBC # BLD: 2.5 K/UL (ref 4.8–10.8)

## 2017-12-16 PROCEDURE — 2580000003 HC RX 258: Performed by: SPECIALIST

## 2017-12-16 PROCEDURE — 3609013000 HC EGD TRANSORAL CONTROL BLEEDING ANY METHOD: Performed by: SPECIALIST

## 2017-12-16 PROCEDURE — 7100000001 HC PACU RECOVERY - ADDTL 15 MIN: Performed by: SPECIALIST

## 2017-12-16 PROCEDURE — 1210000000 HC MED SURG R&B

## 2017-12-16 PROCEDURE — 87077 CULTURE AEROBIC IDENTIFY: CPT

## 2017-12-16 PROCEDURE — 36415 COLL VENOUS BLD VENIPUNCTURE: CPT

## 2017-12-16 PROCEDURE — C9113 INJ PANTOPRAZOLE SODIUM, VIA: HCPCS | Performed by: HOSPITALIST

## 2017-12-16 PROCEDURE — 83735 ASSAY OF MAGNESIUM: CPT

## 2017-12-16 PROCEDURE — 7100000000 HC PACU RECOVERY - FIRST 15 MIN: Performed by: SPECIALIST

## 2017-12-16 PROCEDURE — 3700000000 HC ANESTHESIA ATTENDED CARE: Performed by: SPECIALIST

## 2017-12-16 PROCEDURE — 6360000002 HC RX W HCPCS: Performed by: HOSPITALIST

## 2017-12-16 PROCEDURE — 6360000002 HC RX W HCPCS: Performed by: STUDENT IN AN ORGANIZED HEALTH CARE EDUCATION/TRAINING PROGRAM

## 2017-12-16 PROCEDURE — 2580000003 HC RX 258: Performed by: STUDENT IN AN ORGANIZED HEALTH CARE EDUCATION/TRAINING PROGRAM

## 2017-12-16 PROCEDURE — 2580000003 HC RX 258: Performed by: HOSPITALIST

## 2017-12-16 PROCEDURE — 3700000001 HC ADD 15 MINUTES (ANESTHESIA): Performed by: SPECIALIST

## 2017-12-16 PROCEDURE — 85025 COMPLETE CBC W/AUTO DIFF WBC: CPT

## 2017-12-16 PROCEDURE — 80048 BASIC METABOLIC PNL TOTAL CA: CPT

## 2017-12-16 PROCEDURE — 0DB98ZX EXCISION OF DUODENUM, VIA NATURAL OR ARTIFICIAL OPENING ENDOSCOPIC, DIAGNOSTIC: ICD-10-PCS | Performed by: SPECIALIST

## 2017-12-16 RX ORDER — MAGNESIUM HYDROXIDE 1200 MG/15ML
LIQUID ORAL PRN
Status: DISCONTINUED | OUTPATIENT
Start: 2017-12-16 | End: 2017-12-16 | Stop reason: HOSPADM

## 2017-12-16 RX ORDER — ONDANSETRON 2 MG/ML
4 INJECTION INTRAMUSCULAR; INTRAVENOUS
Status: DISCONTINUED | OUTPATIENT
Start: 2017-12-16 | End: 2017-12-16 | Stop reason: HOSPADM

## 2017-12-16 RX ORDER — 0.9 % SODIUM CHLORIDE 0.9 %
10 VIAL (ML) INJECTION EVERY 12 HOURS SCHEDULED
Status: DISCONTINUED | OUTPATIENT
Start: 2017-12-16 | End: 2017-12-17 | Stop reason: HOSPADM

## 2017-12-16 RX ORDER — PROPOFOL 10 MG/ML
INJECTION, EMULSION INTRAVENOUS PRN
Status: DISCONTINUED | OUTPATIENT
Start: 2017-12-16 | End: 2017-12-16 | Stop reason: SDUPTHER

## 2017-12-16 RX ORDER — SODIUM CHLORIDE 9 MG/ML
INJECTION, SOLUTION INTRAVENOUS CONTINUOUS PRN
Status: DISCONTINUED | OUTPATIENT
Start: 2017-12-16 | End: 2017-12-16 | Stop reason: SDUPTHER

## 2017-12-16 RX ORDER — 0.9 % SODIUM CHLORIDE 0.9 %
10 VIAL (ML) INJECTION PRN
Status: DISCONTINUED | OUTPATIENT
Start: 2017-12-16 | End: 2017-12-17 | Stop reason: HOSPADM

## 2017-12-16 RX ADMIN — PROPOFOL 20 MG: 10 INJECTION, EMULSION INTRAVENOUS at 10:13

## 2017-12-16 RX ADMIN — PROPOFOL 20 MG: 10 INJECTION, EMULSION INTRAVENOUS at 10:00

## 2017-12-16 RX ADMIN — PANTOPRAZOLE SODIUM 40 MG: 40 INJECTION, POWDER, FOR SOLUTION INTRAVENOUS at 14:08

## 2017-12-16 RX ADMIN — PANTOPRAZOLE SODIUM 40 MG: 40 INJECTION, POWDER, FOR SOLUTION INTRAVENOUS at 21:12

## 2017-12-16 RX ADMIN — SODIUM CHLORIDE: 900 INJECTION, SOLUTION INTRAVENOUS at 09:53

## 2017-12-16 RX ADMIN — SODIUM CHLORIDE: 9 INJECTION, SOLUTION INTRAVENOUS at 03:28

## 2017-12-16 RX ADMIN — SODIUM CHLORIDE, PRESERVATIVE FREE 10 ML: 5 INJECTION INTRAVENOUS at 21:13

## 2017-12-16 RX ADMIN — PROPOFOL 20 MG: 10 INJECTION, EMULSION INTRAVENOUS at 10:05

## 2017-12-16 RX ADMIN — PROPOFOL 20 MG: 10 INJECTION, EMULSION INTRAVENOUS at 10:03

## 2017-12-16 RX ADMIN — PROPOFOL 20 MG: 10 INJECTION, EMULSION INTRAVENOUS at 10:09

## 2017-12-16 ASSESSMENT — PULMONARY FUNCTION TESTS
PIF_VALUE: 0
PIF_VALUE: 1
PIF_VALUE: 1
PIF_VALUE: 0
PIF_VALUE: 1
PIF_VALUE: 0
PIF_VALUE: 1

## 2017-12-16 NOTE — ANESTHESIA PRE PROCEDURE
docusate sodium (COLACE) capsule 100 mg  100 mg Oral BID Mina Maldonado MD   100 mg at 12/15/17 2241    ondansetron (ZOFRAN) injection 4 mg  4 mg Intravenous Q6H PRN Mina Maldonado MD        enoxaparin (LOVENOX) injection 40 mg  40 mg Subcutaneous Daily Mina Maldonado MD   Stopped at 12/14/17 0915    pantoprazole (PROTONIX) injection 40 mg  40 mg Intravenous BID Mina Maldonado MD   40 mg at 12/15/17 2242    And    sodium chloride (PF) 0.9 % injection 10 mL  10 mL Intravenous Daily Mina Maldonado MD   10 mL at 12/15/17 1108       Allergies:     Allergies   Allergen Reactions    Lime Flavor [Flavoring Agent]        Problem List:    Patient Active Problem List   Diagnosis Code    GI bleed K92.2    Acute blood loss anemia D62    SIRS (systemic inflammatory response syndrome) (HCC) R65.10    Lactic acidosis E87.2    Elevated troponin R74.8    Encephalopathy G93.40       Past Medical History:        Diagnosis Date    Arthritis        Past Surgical History:        Procedure Laterality Date    TONSILLECTOMY         Social History:    Social History   Substance Use Topics    Smoking status: Former Smoker    Smokeless tobacco: Never Used    Alcohol use 4.2 oz/week     7 Glasses of wine per week      Comment: occasionally                                Counseling given: Not Answered      Vital Signs (Current):   Vitals:    12/16/17 0119 12/16/17 0319 12/16/17 0728 12/16/17 0744   BP: (!) 112/31 (!) 106/42  (!) 119/44   Pulse: 90 88  78   Resp:    18   Temp: 98.1 °F (36.7 °C) 98.8 °F (37.1 °C)  99.7 °F (37.6 °C)   TempSrc:    Axillary   SpO2: 99% 98%  98%   Weight:   133 lb (60.3 kg)    Height:                                                  BP Readings from Last 3 Encounters:   12/16/17 (!) 119/44   11/18/17 (!) 138/54   10/21/17 (!) 140/78       NPO Status: Time of last liquid consumption: 2359                        Time of last solid consumption: 1900                        Date of last liquid infarct , age undetermined  ST & T wave abnormality, consider inferolateral ischemia  Abnormal ECG  When compared with ECG of 18-NOV-2017 12:30,  premature ventricular complexes are now present  Vent. rate has increased BY  48 BPM  Left bundle branch block is no longer present  Anteroseptal infarct is now present     Neuro/Psych:   Negative Neuro/Psych ROS              GI/Hepatic/Renal: Neg GI/Hepatic/Renal ROS           ROS comment: GI bleed. Endo/Other: Negative Endo/Other ROS             Pt had no PAT visit        ROS comment: hypokalemia Abdominal:           Vascular:                                        Anesthesia Plan      MAC     ASA 3 - emergent       Induction: intravenous. Anesthetic plan and risks discussed with patient and legal guardian.         Attending anesthesiologist reviewed and agrees with Luis Torres DO   12/16/2017

## 2017-12-16 NOTE — PROGRESS NOTES
Dr. Awilda Starks in with family and reviewed findings. Family left and patient is resting comfortably. Even, unlabored respirations, color good. No complaints.

## 2017-12-16 NOTE — PROGRESS NOTES
Pt assessment completed. Pt resting in bed. Is alert and oriented x 4. Consent for EGD has been signed. Pt aware and understands procedure. Pt denies pain, SOB, n/v.  Carter draining rohan color urine. Daughter is at bedside. Daughter would like someone to talk to her about becoming POA, spiritual care has been consulted. No further needs at this time, will continue to monitor.      Electronically signed by Susy Garcia RN on 12/16/2017 at 2:12 AM

## 2017-12-16 NOTE — PROGRESS NOTES
Physical Therapy   Facility/Department: [facility] MED SURG MLOZ OR Pool/NONE    NAME: Thee Meadows    : 1935 (81 y.o.)  MRN: [id number]    Account: [de-identified]  Gender: female    PT evaluation and treatment orders received. Chart reviewed. PT eval attempted. HOLD: pt in OR for EGD. Will attempt PT evaluation again at earliest convenience.         Electronically signed by Jay Walsh PT on 17 at 10:08 AM

## 2017-12-16 NOTE — PROGRESS NOTES
INPATIENT PROGRESS NOTE    SERVICE DATE:  12/16/2017   SERVICE TIME:  11:14    ASSESSMENT AND PLAN   Delia Holloway is an 52-year-old female with past medical history of alcoholic liver disease, brought in by  due to confusion, and diarrhea with black stools. Admitted with metabolic encephalopathy,SIRS, melena concerning for GI bleed, dehydration, and elevated troponins.     GI Bleed: Secondary to large duodenal ulcer as per EGD this AM. Likely secondary to chronic Mobic use. - Initiated IV protonix BID, continued  - D/C Mobic. Ptb and  counseled. - GI consulted. Discussed with Dr. Asha De Jesus, mild esophagitis and large duodenal ulcer with no active bleeding. Avoid NSAID's. Vontinue IV protonix for now which can be transitioned to PO on discharge. - Monitor hgb  - Continue to monitor closely     Acute blood loss anemia: Likely secondary to upper GI bleed as above. - Telemetry  - Type and screen  - recheck of hgb  - Transfuse 2 units PRBC's if hgb less than 7.0  - Continue Rx as above  - Continue to monitor closely     SIRS: Likely secondary to the above  - blood and urine cultures were done and results are NTD  - Given dose of Rocephin in ER  - Checked ESR, CRP  - Recheck CBC     Acute Metabolic Encephalopathy: Likely secondary to the above. Family reports pts mental status altered since last discharge from the hospital.   - Neurochecks  - Continue Rx as above  - Telemetry  - Consult neurology: Per Dr. Suzan Pennington, Closed head injury with a fall with mental status changes. I truly do not feel that some of these findings may be new given what I see. The patient is quite disheveled and emaciated. Underlying malignancy is a possibility. Closed head injury, subdural hematoma, or initial stroke is a possibility, suffered six weeks ago. This cannot be isolated. The laboratory evaluation for dementia will be pursued. We will arrange for an  MR of the brain and right shoulder.   It is likely that she may

## 2017-12-16 NOTE — ANESTHESIA POSTPROCEDURE EVALUATION
Department of Anesthesiology  Postprocedure Note    Patient: Thee Meadows  MRN: [id number]  [place]: 1935  Date of evaluation: 12/16/2017  Time:  10:24 AM     Procedure Summary     Date:  12/16/17 Room / Location:  [facility] OR    Anesthesia Start:  8943 Anesthesia Stop:      Procedure:  EGD CONTROL HEMORRHAGE (N/A Esophagus) Diagnosis:  (gi bleeding)    Surgeon:  Angelo Hendrickson MD Responsible Provider:  Bev Brady DO    Anesthesia Type:  MAC ASA Status:  3 - Emergent          Anesthesia Type: MAC    Scott Phase I:      Scott Phase II:      Last vitals: Reviewed and per EMR flowsheets.        Anesthesia Post Evaluation    Patient location during evaluation: bedside  Patient participation: complete - patient participated  Level of consciousness: awake and awake and alert  Pain score: 0  Airway patency: patent  Nausea & Vomiting: no nausea and no vomiting  Complications: no  Cardiovascular status: blood pressure returned to baseline and hemodynamically stable  Respiratory status: acceptable  Hydration status: euvolemic

## 2017-12-16 NOTE — CONSULTS
[address] [address]                       [address], [address] [facility]                                   CONSULTATION    PATIENT NAME: Bautista Brown                   :        1935  MED REC NO:   [id number]                            ROOM:       X720  ACCOUNT NO:   [de-identified]                           ADMIT DATE: 2017  PROVIDER:     Jairo Dyer MD    CONSULT DATE:  12/15/2017    CONSULT DATE:  12/15/2017    ATTENDING:  Janeth Narvaez MD    HISTORY OF PRESENT ILLNESS:  This is an 71-year-old right-handed female  admitted with a history of a fall. The patient fell six weeks ago and  since has continued to decline. It is very difficult to ascertain her  history here as her  tells me that she was normal prior to this. She also has a GI bleed. GI is on consult. She herself is not giving me  much history. She was independent prior to this. I am not quite sure how  both the  the wife function at home. She appears to be quite  disheveled. She complained of shoulder pain. She had a dislocated  shoulder which was repaired, but she is not able to raise her arm. She has  some tremuluscency which appears to be reported to be new. She does not  have a history of Parkinson's disease; does not have any family history of  positive TIAs, seizures, bleeding, bruising, choking, or drooling. The  fall was unwitnessed, and therefore, it is unclear if she truly hit her  head. This is very difficult to ascertain either. In any case, the patient denies any chest pain or shortness of breath. She  does not complain of any bleeding, bruising, chocking, drooling, falls,  injuries, or trauma except for the one that is prior mentioned. PAST MEDICAL HISTORY:  Mostly unremarkable. She has a history only  reported of arthritis. PAST SURGICAL HISTORY:  She has no surgical history except for  tonsillectomy.     MEDICATIONS:  She is on no medication that will cause mental status  changes. Everything is p.r.n. PHYSICAL EXAMINATION:  GENERAL:  She is thinly built, very disheveled. NECK:  Supple. There are no meningeal signs. CARDIOVASCULAR SYSTEM:  S1 and S2 are normal.  No murmurs are appreciated. No carotid bruits. RESPIRATORY SYSTEM:  Clear to auscultation. CNS:  She is awake and alert. She knows she is in the hospital.  She does  not know the month or the year. She is quite tremuluscent. Tone is  midline. Palate moves symmetrically. Examination of the extremities, no  pronator drift. She cannot raise her right shoulder with abduction  weakness of the shoulder, 0/5. She has decreased biceps reflex also on the  right. Motor strength is otherwise 4/5 throughout except for the shoulder  abduction weakness. Reflexes are 2+. She is not myelopathic. She has  mild ataxia. Sensory examination is difficulty to ascertain, and she does  not have any sensory levels at least.    LABORATORY EXAMINATION:  Her hemoglobin is 8.2 with hematocrit 27.5. Her  lactic acid is 1.0. Her WBC count of 2.7, hemoglobin 7.2, hematocrit 22.2,  and platelets are 37,624. Basic metabolic profile, sodium 757 with a calcium of 7.7. Her thyroid  function tests are not done. CRP was 25.5 with a sed rate of 16. Her  initial CT of the head did not reveal anything significant. IMPRESSION:  Closed head injury with a fall with mental status changes. I  truly do not feel that some of these findings may be new given what I see. The patient is quite disheveled and emaciated. Underlying malignancy is a  possibility. Closed head injury, subdural hematoma, or initial stroke is a  possibility, suffered six weeks ago. This cannot be isolated. The  laboratory evaluation for dementia will be pursued. We will arrange for an  MR of the brain and right shoulder. It is likely that she may have a  rotator cuff injury. We will follow her with you and see what we find.   An  ammonia level is recommended. Thank you Dr. Mara Winston.         Maryjane Whitaker MD    D: 12/15/2017 16:50:12       T: 12/16/2017 6:04:54     [email or web address]  Job#: [id number]     Doc#: [id number]    CC:

## 2017-12-17 VITALS
TEMPERATURE: 98.4 F | OXYGEN SATURATION: 99 % | HEART RATE: 72 BPM | DIASTOLIC BLOOD PRESSURE: 52 MMHG | SYSTOLIC BLOOD PRESSURE: 122 MMHG | WEIGHT: 133 LBS | HEIGHT: 64 IN | RESPIRATION RATE: 16 BRPM | BODY MASS INDEX: 22.71 KG/M2

## 2017-12-17 LAB
ANION GAP SERPL CALCULATED.3IONS-SCNC: 8 MEQ/L (ref 7–13)
BASOPHILS ABSOLUTE: 0 K/UL (ref 0–0.2)
BASOPHILS RELATIVE PERCENT: 0.8 %
BUN BLDV-MCNC: 7 MG/DL (ref 8–23)
CALCIUM SERPL-MCNC: 7.3 MG/DL (ref 8.6–10.2)
CHLORIDE BLD-SCNC: 111 MEQ/L (ref 98–107)
CO2: 23 MEQ/L (ref 22–29)
CREAT SERPL-MCNC: 0.46 MG/DL (ref 0.5–0.9)
EOSINOPHILS ABSOLUTE: 0.2 K/UL (ref 0–0.7)
EOSINOPHILS RELATIVE PERCENT: 5.5 %
GFR AFRICAN AMERICAN: >60
GFR NON-AFRICAN AMERICAN: >60
GLUCOSE BLD-MCNC: 131 MG/DL (ref 74–109)
HCT VFR BLD CALC: 24.6 % (ref 37–47)
HEMOGLOBIN: 8.1 G/DL (ref 12–16)
LYMPHOCYTES ABSOLUTE: 0.5 K/UL (ref 1–4.8)
LYMPHOCYTES RELATIVE PERCENT: 19.1 %
MAGNESIUM: 1.6 MG/DL (ref 1.7–2.3)
MCH RBC QN AUTO: 34.9 PG (ref 27–31.3)
MCHC RBC AUTO-ENTMCNC: 33.1 % (ref 33–37)
MCV RBC AUTO: 105.5 FL (ref 82–100)
MONOCYTES ABSOLUTE: 0.4 K/UL (ref 0.2–0.8)
MONOCYTES RELATIVE PERCENT: 12.5 %
NEUTROPHILS ABSOLUTE: 1.8 K/UL (ref 1.4–6.5)
NEUTROPHILS RELATIVE PERCENT: 62.1 %
PDW BLD-RTO: 16.4 % (ref 11.5–14.5)
PLATELET # BLD: 76 K/UL (ref 130–400)
POTASSIUM SERPL-SCNC: 3.4 MEQ/L (ref 3.5–5.1)
RBC # BLD: 2.34 M/UL (ref 4.2–5.4)
RPR: NORMAL
SODIUM BLD-SCNC: 142 MEQ/L (ref 132–144)
WBC # BLD: 2.8 K/UL (ref 4.8–10.8)

## 2017-12-17 PROCEDURE — G8979 MOBILITY GOAL STATUS: HCPCS

## 2017-12-17 PROCEDURE — 85025 COMPLETE CBC W/AUTO DIFF WBC: CPT

## 2017-12-17 PROCEDURE — C9113 INJ PANTOPRAZOLE SODIUM, VIA: HCPCS | Performed by: HOSPITALIST

## 2017-12-17 PROCEDURE — 6370000000 HC RX 637 (ALT 250 FOR IP): Performed by: HOSPITALIST

## 2017-12-17 PROCEDURE — G8978 MOBILITY CURRENT STATUS: HCPCS

## 2017-12-17 PROCEDURE — 80048 BASIC METABOLIC PNL TOTAL CA: CPT

## 2017-12-17 PROCEDURE — 6360000002 HC RX W HCPCS: Performed by: HOSPITALIST

## 2017-12-17 PROCEDURE — 36415 COLL VENOUS BLD VENIPUNCTURE: CPT

## 2017-12-17 PROCEDURE — G8987 SELF CARE CURRENT STATUS: HCPCS

## 2017-12-17 PROCEDURE — 83735 ASSAY OF MAGNESIUM: CPT

## 2017-12-17 PROCEDURE — 97166 OT EVAL MOD COMPLEX 45 MIN: CPT

## 2017-12-17 PROCEDURE — G8988 SELF CARE GOAL STATUS: HCPCS

## 2017-12-17 PROCEDURE — 97162 PT EVAL MOD COMPLEX 30 MIN: CPT

## 2017-12-17 PROCEDURE — 2580000003 HC RX 258: Performed by: HOSPITALIST

## 2017-12-17 RX ORDER — PANTOPRAZOLE SODIUM 40 MG/1
40 TABLET, DELAYED RELEASE ORAL DAILY
Qty: 30 TABLET | Refills: 0 | Status: ON HOLD | OUTPATIENT
Start: 2017-12-17 | End: 2018-08-11 | Stop reason: HOSPADM

## 2017-12-17 RX ORDER — POTASSIUM CHLORIDE 20 MEQ/1
40 TABLET, EXTENDED RELEASE ORAL ONCE
Status: COMPLETED | OUTPATIENT
Start: 2017-12-17 | End: 2017-12-17

## 2017-12-17 RX ORDER — MAGNESIUM SULFATE IN WATER 40 MG/ML
2 INJECTION, SOLUTION INTRAVENOUS ONCE
Status: COMPLETED | OUTPATIENT
Start: 2017-12-17 | End: 2017-12-17

## 2017-12-17 RX ADMIN — MAGNESIUM SULFATE IN WATER 2 G: 40 INJECTION, SOLUTION INTRAVENOUS at 14:42

## 2017-12-17 RX ADMIN — SODIUM CHLORIDE: 9 INJECTION, SOLUTION INTRAVENOUS at 03:03

## 2017-12-17 RX ADMIN — PANTOPRAZOLE SODIUM 40 MG: 40 INJECTION, POWDER, FOR SOLUTION INTRAVENOUS at 10:15

## 2017-12-17 RX ADMIN — POTASSIUM CHLORIDE 40 MEQ: 20 TABLET, EXTENDED RELEASE ORAL at 14:42

## 2017-12-17 RX ADMIN — SODIUM CHLORIDE, PRESERVATIVE FREE 10 ML: 5 INJECTION INTRAVENOUS at 10:16

## 2017-12-17 NOTE — PLAN OF CARE
Problem: IP BALANCE  Goal: LTG - Patient will maintain balance to allow for safe/functional mobility  Outcome: Ongoing  Pt. SBA for balance during room mobility task. See OT eval for all goals.   Electronically signed by SERENA Zamudio/L on 12/17/2017 at 2:33 PM

## 2017-12-17 NOTE — PROGRESS NOTES
Physical Therapy Med Surg Initial Assessment  [facility]/Department[facility]  [facility]       NAME: Beth Eubanks  : 1935 (81 y.o.)  MRN: [id number]  CODE STATUS: Full Code    Date of Service: 2017    Patient Diagnosis(es): GI bleed [K92.2]  GI bleed [K92.2]   Chief Complaint   Patient presents with    Emesis     and diarrhea, recent confusion, weakness     Patient Active Problem List    Diagnosis Date Noted    GI bleed 2017    Acute blood loss anemia 2017    SIRS (systemic inflammatory response syndrome) (HCC) 2017    Lactic acidosis 2017    Elevated troponin 2017    Encephalopathy 2017        Past Medical History:   Diagnosis Date    Arthritis      Past Surgical History:   Procedure Laterality Date    TONSILLECTOMY         Chart Reviewed: Yes  Patient assessed for rehabilitation services?: Yes  Diagnosis: GI bleed  General Comment  Comments: pt did well with walker    Restrictions:  Restrictions/Precautions: General Precautions, Fall Risk  Body mass index is 22.83 kg/m². SUBJECTIVE: Subjective: Pt has no complaints.  Pain 0/10       Post Treatment Pain Screenin/10 pain    Prior Level of Function:  Social/Functional History  Lives With: Spouse  Type of Home: House  Home Layout: One level  Home Access: Stairs to enter with rails  Entrance Stairs - Number of Steps: 2  Entrance Stairs - Rails: Right  Ambulation Assistance: Independent  Transfer Assistance: Independent  Additional Comments: prior to admission used can primarily    OBJECTIVE:   Vision/Hearing:  Vision: Within Functional Limits  Hearing: Within functional limits    Cognition:  Overall Orientation Status: Within Functional Limits  Follows Commands: Within Functional Limits    Observation/Palpation  Posture: Good  Observation: good balance with ambulation and negotiation of walker    ROM:  RLE AROM: WFL  LLE AROM : WFL  RUE AROM : WFL  LUE AROM : primarily used a straight cane. Pt to be progressed as tolerated. REQUIRES PT FOLLOW UP: Yes      PLAN OF CARE:  Plan  Times per week: 3-6  Times per day: Daily  Current Treatment Recommendations: Strengthening, Neuromuscular Re-education, Home Exercise Program, Manual Therapy - Soft Tissue Mobilization, Safety Education & Training, Balance Training, Endurance Training, Patient/Caregiver Education & Training, Functional Mobility Training, Manual Therapy - Joint Manipulation, Transfer Training, Gait Training, Stair training  Plan Comment: Initiate POC  Safety Devices  Type of devices: Call light within reach, Chair alarm in place, Patient at risk for falls, Telesitter in use, Left in chair    G-Code:  PT G-Codes  Functional Limitation: Mobility: Walking and moving around  Mobility: Walking and Moving Around Current Status (): At least 20 percent but less than 40 percent impaired, limited or restricted  Mobility: Walking and Moving Around Goal Status (): At least 1 percent but less than 20 percent impaired, limited or restricted    Goals:  Patient goals : return home  Short term goals  Time Frame for Short term goals: 10 days  Short term goal 1: Initiate HEP and progress as tolerated  Short term goal 2: Pt will be independent with bed mobility without use of handrails  Short term goal 3: Pt will be independent with transfers with or without device and good demonstration of safety. Short term goal 4: Pt will ambulate up to 250 feet with least restrictive device independently with good balance. Short term goal 5: Pt functional LE strength will improve to allow above stated goals to be met.      Therapy Time:   Individual   Time In 1157   Time Out 1221   Minutes Shabana Garduno DPT   12/17/17 at 1:02 PM

## 2017-12-17 NOTE — PROGRESS NOTES
INPATIENT PROGRESS NOTE    SERVICE DATE:  12/17/2017   SERVICE TIME:  11:14    ASSESSMENT AND PLAN   Anna Rizvi is an 50-year-old female with past medical history of alcoholic liver disease, brought in by  due to confusion, and diarrhea with black stools. Admitted with metabolic encephalopathy,SIRS, melena concerning for GI bleed, dehydration, and elevated troponins.     GI Bleed: Secondary to large duodenal ulcer as per EGD this AM. Likely secondary to chronic Mobic use. - Initiated IV protonix BID, continued  - D/C Mobic. Pt and  counseled. - GI consulted. Discussed with Dr. Josué Ocampo, mild esophagitis and large duodenal ulcer with no active bleeding. Avoid NSAID's. Continue IV protonix for now which can be transitioned to PO on discharge. - Monitor hgb  - Continue to monitor closely     Acute blood loss anemia: Likely secondary to upper GI bleed as above. - Telemetry  - Type and screen  - recheck of hgb  - Transfuse 2 units PRBC's if hgb less than 7.0  - Continue Rx as above  - Continue to monitor closely     SIRS: Likely secondary to the above  - blood and urine cultures were done and results are NTD  - Given dose of Rocephin in ER  - Checked ESR, CRP  - Recheck CBC     Acute Metabolic Encephalopathy: Likely secondary to the above. Family reports pts mental status altered since last discharge from the hospital.   - Neurochecks  - Continue Rx as above  - Telemetry  - Consult neurology: Per Dr. Marques Batista, Closed head injury with a fall with mental status changes. I truly do not feel that some of these findings may be new given what I see. The patient is quite disheveled and emaciated. Underlying malignancy is a possibility. Closed head injury, subdural hematoma, or initial stroke is a possibility, suffered six weeks ago. This cannot be isolated. The laboratory evaluation for dementia will be pursued. We will arrange for an  MR of the brain and right shoulder.   It is likely that she may have a rotator cuff injury. We will follow her with you and see what we find. An ammonia level is recommended. Chase further dispo recs. - Continue to monitor     Dehydration  -IV fluids     Elevated troponin: Trended down  - Telemetry  - Serial troponins, and CKMB ordered     Dispo: May D/[facility] home with [facility] today. Advised F/U with PCP 1 - 2 days of discharge.        SUBJECTIVE  CHIEF COMPLAINT: AMS, Melena    PRIMARY SERVICE: Medicine    INTERVAL HPI: Pt says she still feels weak but better. Denies CP, SOB. Inquiring about discharge today.      MEDICATIONS:    Current Facility-Administered Medications   Medication Dose Route Frequency Provider Last Rate Last Dose    magnesium sulfate 2 g in 50 mL IVPB premix  2 g Intravenous Once Analilia Wen MD        potassium chloride (KLOR-CON M) extended release tablet 40 mEq  40 mEq Oral Once Analilia Wen MD        sodium chloride (PF) 0.9 % injection 10 mL  10 mL Intravenous 2 times per day Kenyatta Zamudio MD   10 mL at 12/16/17 2113    sodium chloride (PF) 0.9 % injection 10 mL  10 mL Intravenous PRN Kenyatta Zamudio MD        0.9 % sodium chloride infusion 250 mL  250 mL Intravenous Once Analilia Wen MD        octreotide (SANDOSTATIN) 500 mcg in sodium chloride 0.9 % 100 mL infusion  50 mcg/hr Intravenous Continuous Kenyatta Zamudio MD 10 mL/hr at 12/15/17 2241 50 mcg/hr at 12/15/17 2241    sodium chloride flush 0.9 % injection 3 mL  3 mL Intravenous Q8H Nunu Daigle MD   3 mL at 12/15/17 2243    ondansetron (ZOFRAN-ODT) disintegrating tablet 4 mg  4 mg Oral Q8H PRN Nunu Daigle MD        morphine injection 2 mg  2 mg Intravenous Q2H PRN Nunu Daigle MD   2 mg at 12/14/17 2023    0.9 % sodium chloride infusion   Intravenous Continuous Analilia Wen MD 75 mL/hr at 12/17/17 0303      sodium chloride flush 0.9 % injection 10 mL  10 mL Intravenous 2 times per day Analilia Wen MD   10 mL at 12/15/17 2242    sodium chloride flush 0.9 % injection 10 mL  10 mL Intravenous PRN Dom Dwyer MD        acetaminophen (TYLENOL) tablet 650 mg  650 mg Oral Q4H PRN Dom Dwyer MD        docusate sodium (COLACE) capsule 100 mg  100 mg Oral BID Dom Dwyer MD   100 mg at 12/15/17 2241    ondansetron (ZOFRAN) injection 4 mg  4 mg Intravenous Q6H PRN Dom Dwyer MD        enoxaparin (LOVENOX) injection 40 mg  40 mg Subcutaneous Daily Dom Dwyer MD   Stopped at 12/14/17 0915    pantoprazole (PROTONIX) injection 40 mg  40 mg Intravenous BID Dom Dwyer MD   40 mg at 12/17/17 1015    And    sodium chloride (PF) 0.9 % injection 10 mL  10 mL Intravenous Daily Dom Dwyer MD   10 mL at 12/17/17 1016       OBJECTIVE  PHYSICAL EXAM:   BP (!) 118/41   Pulse 70   Temp 98.2 °F (36.8 °C) (Oral)   Resp 16   Ht 5' 4\" (1.626 m)   Wt 133 lb (60.3 kg)   SpO2 99%   BMI 22.83 kg/m²   Body mass index is 22.83 kg/m². CONSTITUTIONAL:  awake, alert, confused  EYES:  Lids and lashes normal, pupils equal, round and reactive to light, extra ocular muscles intact, sclera clear, conjunctiva normal  LUNGS:  No increased work of breathing, good air exchange, clear to auscultation bilaterally, no crackles or wheezing  CARDIOVASCULAR:  Normal apical impulse, regular rate and rhythm, normal S1 and S2, no S3 or S4, and no murmur noted  ABDOMEN:  No scars, normal bowel sounds, soft, non-distended, non-tender, no masses palpated, no hepatosplenomegally  EXTREMITIES: No clubbing, no cyanosis, no edema    DATA:   Diagnostic tests reviewed for today's visit:    Most recent labs and imaging results reviewed.      SIGNATURE: Dom Dwyer MD PATIENT NAME: Deborah Hogan   DATE: December 17, 2017 MRN: [id number]   TIME: 12:41 PM PAGER: [phone]

## 2017-12-17 NOTE — PROGRESS NOTES
Neurology Follow up    SUBJECTIVE:  NO Headache, double vision,blurry vision,difficulty with speech,difficulty with swallowing,weakness,numbness,pain,nausea,vomitting,chocking,neck pain,dizziness,  Left shoulder pain  Feels better  Getting to baseline  PHYSICAL EXAM:    BP (!) 118/41   Pulse 70   Temp 98.2 °F (36.8 °C) (Oral)   Resp 16   Ht 5' 4\" (1.626 m)   Wt 133 lb (60.3 kg)   SpO2 99%   BMI 22.83 kg/m²   General Appearance:      Mental Status Exam:             Level of Alertness:   awake            Orientation:   person, place, time              Funduscopic Exam:     Cranial Nerves        Cranial nerve II           Visual acuity:  normal           Visual fields:  normal      Cranial nerve III           Pupils:  equal, round, reactive to light      Cranial nerves III, IV, VI           Extraocular Movements: intact      Cranial nerve V           Facial sensation:  intact      Cranial nerve VII           Facial strength: intact      Cranial nerve VIII           Hearing:  intact      Cranial nerve IX           Palate:  intact      Cranial nerve XI         Shoulder shrug:  intact      Cranial nerve XII          Tongue movement:  normal    Motor:    Drift:  absent  Motor exam is symmetrical 4 out of 5 all extremities bilaterally, witrh no left arm abduction  Tone:  normal  Abnormal Movements:  absent            Sensory:        Pinprick             Right Upper Extremity:  normal             Left Upper Extremity:  normal             Right Lower Extremity:  normal             Left Lower Extremity:  normal           Vibration                         Touch            Proprioception                 Coordination:           Finger/Nose   Right:  normal              Left:  normal          Heel-Knee-Shin                Right:  normal              Left:  normal          Rapid Alternating Movements              Right:  normal              Left:  normal          Gait:                       Casual:  SBA Romberg:  normal            Reflexes:             Deep Tendon Reflexes:             Reflexes are 2 +             Plantar response:                Right:  downgoing               Left:  downgoing    Vascular:  Cardiac Exam:  normal         Xr Acute Abd Series Chest 1 Vw    Result Date: 12/14/2017  EXAMINATION: ACUTE ABDOMEN CLINICAL HISTORY: Vomiting COMPARISON :None FINDINGS: Three views of the abdomen are submitted. The cardiac silhouette is enlarged. 89 year Mediastinum is unremarkable. Pulmonary vasculature is attenuated. Lung fields are hyperinflated. .  No focal infiltrate. No pneumothoraces. No free air. Supine and erect films of the abdomen show no free air. Bowel gas is seen in a nonspecific nonobstructive pattern. Visualized osseous structures show diffuse generalized osteopenia. There is degenerative changes of both shoulders. There is old healed right rib fractures. 1.  NO ACTIVE CARDIOPULMONARY DISEASE. 2.  NO FREE AIR. 3.  NONSPECIFIC BOWEL GAS PATTERN     Ct Head Wo Contrast    Result Date: 12/14/2017  CT Brain Contrast medium:  Not utilized. History:  Delirium Comparison:  September 27, 2008 Findings: Extra-axial spaces:  Normal. Intracranial hemorrhage:  None. Ventricular system:  Normal for age. Basal Cisterns:  Normal. Cerebral Parenchyma:  Normal. Midline Shift:  None. Cerebellum:  Normal. Brainstem:  Normal.  Vascular System:  Normal.  Paranasal sinuses and mastoid air cells:  Normal. Visualized Orbits:  Normal.     Impression: No acute findings. All CT scans at this facility use dose modulation, iterative reconstruction, and/or weight based dosing when appropriate to reduce radiation dose to as low as reasonably achievable.     Mri Shoulder Left Wo Contrast    Result Date: 12/16/2017  Patient: Mark Tatum  Time Out: 11:54 Exam(s): MRI LEFT SHOULDER Without Contrast  EXAM:   MR Left Upper Extremity Without Intravenous Contrast, Shoulder  CLINICAL HISTORY:   Shoulder pain, dislocation signal in the brain. There is no intracranial mass, hemorrhage, \"mass effect\" or midline shift. No acute intra-axial or extra-axial findings in the brain. Visualized sinuses and mastoids appear clear. 1. MILD DEGREE OF CEREBRAL ATROPHY AND AGE-RELATED FINDINGS IN THE BRAIN. 2. PATCHY WHITE MATTER T2 HYPERINTENSE FOCI LIKELY A SEQUELA OF SMALL VESSEL DISEASE. 3. NO RESTRICTED DIFFUSION OR MRI EVIDENCE OF AN ACUTE OR SUBACUTE CEREBRAL INFARCTION. 4. NO ACUTE INTRA-AXIAL OR EXTRA-AXIAL FINDINGS IN THE BRAIN. 5. NO INTRACRANIAL MASS, HEMORRHAGE, \"MASS EFFECT\" OR MIDLINE SHIFT. Recent Labs      12/15/17   0711  12/15/17   1257  12/16/17   0722  12/17/17   0642   WBC  2.7*   --   2.5*  2.8*   HGB  7.2*  8.2*  8.0*  8.1*   PLT  81*   --   92*  76*     Recent Labs      12/15/17   0711  12/16/17   0722  12/17/17   0642   NA  144  139  142   K  3.3*  4.2  3.4*   CL  111*  108*  111*   CO2  24  20*  23   BUN  21  13  7*   CREATININE  0.49*  0.52  0.46*   GLUCOSE  84  129*  131*     No results for input(s): BILITOT, ALKPHOS, AST, ALT in the last 72 hours.   Lab Results   Component Value Date    PROTIME 15.0 12/14/2017    INR 1.4 12/14/2017     No results found for: LITHIUM, DILFRTOT, VALPROATE    ASSESSMENT AND PLAN  Encephalopathy secondary to [posssible CHI  Much better  Left rotator cuff injury, tear  Ortho following  OK d/c home  MRI see, will need f/u OP for cognitive issues as may be early dementia

## 2017-12-17 NOTE — PROGRESS NOTES
[facility] OCCUPATIONAL THERAPY EVALUATION - ACUTE     Date: 2017  Patient Name: Delia Holloway        MRN: [id number]  Account: [de-identified]   : 1935  (81 y.o.)  Room: [place] [address]    Chart Review:  Diagnosis:  The primary encounter diagnosis was Nausea and vomiting, intractability of vomiting not specified, unspecified vomiting type. Diagnoses of Dehydration, Diarrhea, unspecified type, Gastrointestinal hemorrhage with melena, and General weakness were also pertinent to this visit. Past Medical History:   Diagnosis Date    Arthritis      Past Surgical History:   Procedure Laterality Date    TONSILLECTOMY       Precautions:  Falls  Restrictions/Precautions: General Precautions, Fall Risk    Evaluation and Pt. rights have been reviewed: [x]Yes   [] No   If no why not:   Falls safety interventions in place  [x]Yes   [] No    Comments:     Subjective: \"I feel pretty good\"    Prior living arrangement:      Support contact: Daughter    Pt lives: [] Alone   [x] With spouse   [x] Other   Comment:  Plans to go to daughter's house on d/c to recover  Home: [] Single level   [x]  Two level   []  Split level     []  Apartment:     Entrance:  Stairs: 1 flights (town house) Hand rails 1 rail,    Inside: Stairs: 1 flight to main level Hand rails: 1  Bathroom: [] [place]   [x] Tub/Shower combo   [x]  Shower stall    Location: Upper level    DME: [] W/W   [x] Eugena Pool   [x] Rollator   []  W/C   [] Sameul Kelp   [] Shower Chair   [] [facility]  [] Dressing  AE  [] Other:      Previous Functional Status:  Independent with ADLs, IADLs.      Pain:   Start of session: 0/10  Description: NA  Location: NA  End of session: 0/10  Action: [] No Action Necessary    [] Patient reports pain at acceptable level for treatment  [] Nursing notified    [] Other      Objective:  Observation:  Pt. alert and attentive    Orientation: Oriented to  [x] Person   [x] Place  [x]Time    V[facility]   [facility]   [] Impaired  Comments: Glasses      Hearing: manner   [x]  Improve R UE Function (AROM, strength, motor control, tone normalization) to complete ADLs as projected. [x]  Improve L UE strength and endurance to 4+/5 in order to participate in self-care activities as projected. [x]  Access appropriate D/C site with as few architectural barriers as possible. Patient Goal: Get as independent as I can  Discussed and agreed upon: [x] Yes   [] No         Comments:     Assessment/Discharge Disposition:  Assessment: Pt. demonstrated decreased balance, strength and endurance which impacts her ability to safely perform ADLs. Pt. would benefit from skilled OT to improve these areas and increase independence with ADL tasks. Performance deficits / Impairments: Decreased functional mobility , Decreased ADL status, Decreased strength, Decreased ROM, Decreased balance, Decreased endurance, Decreased high-level IADLs  Prognosis: Good  Discharge Recommendations: Continue to assess pending progress  History: Pt's medical history  moderately complex  Exam: Pt. has seven performance deficits  Assistance / Modification: Pt. required min A for ADLs    Prognosis:  [x] Good   []Fair   [] Poor     Barriers to Improvement:  None    Recommended DME:  [x] W/W   [] Demetrio Amabile   [] Rollator   [] W/C   [] Handy Wharton  [] Shower Chair   []Dressing AD []  [facility]  [] Other:  Pt. would benefit from a [employer] to improve safety with in-home and out of home  mobility. Pt. reports she feels safest using 2WW vs. rollator       Plan:Times per week: 1-3x,      G-Codes:  OT G-codes  Functional Assessment Tool Used: Clinical observation  Functional Limitation: Self care  Self Care Current Status (): At least 20 percent but less than 40 percent impaired, limited or restricted  Self Care Goal Status (): At least 1 percent but less than 20 percent impaired, limited or restricted    Time in:  1359  Time out:  1430  Timed treatment minutes:     Total treatment time/minutes:  31    Electronically signed by: Aziza Snyder OT  12/17/2017, 2:32 PM

## 2017-12-17 NOTE — PROGRESS NOTES
 Lime Flavor [Flavoring Agent]      Principal Problem:    GI bleed  Active Problems:    Acute blood loss anemia    SIRS (systemic inflammatory response syndrome) (HCC)    Lactic acidosis    Elevated troponin    Encephalopathy    Blood pressure (!) 118/41, pulse 70, temperature 98.2 °F (36.8 °C), temperature source Oral, resp. rate 16, height 5' 4\" (1.626 m), weight 133 lb (60.3 kg), SpO2 99 %. Subjective having diarrhea, no emesis, no signs of active gi bleeding  Objective hb stable, CIARA test neg  Assessment & Plan diarhea, duodenal ulcer,no signs of ongoing bleeding,continue protonix, stool studies.     Mari Peng MD  12/17/2017

## 2017-12-17 NOTE — PROGRESS NOTES
Went over d/c pprwork and new med with pt and her daughter- no questions or concerns.  Pt urinated twice after espinosa removal.

## 2017-12-18 LAB — CLOTEST: NEGATIVE

## 2017-12-18 NOTE — PROGRESS NOTES
Physical Therapy  Facility/Department: [facility] MED SURG I708/M639-67  Physical Therapy Discharge      NAME: La Nena Smith    : 1935 (81 y.o.)  MRN: [id number]    Account: [de-identified]  Gender: female      Patient has been discharged from acute care hospital. DC patient from current PT program.    Electronically signed by Kiesha Li PT on 17 at 12:41 PM

## 2017-12-19 LAB
BLOOD CULTURE, ROUTINE: NORMAL
BLOOD CULTURE, ROUTINE: NORMAL
CULTURE, BLOOD 2: NORMAL
CULTURE, BLOOD 2: NORMAL

## 2018-01-15 NOTE — DISCHARGE SUMMARY
disheveled and emaciated. Merdis Squire malignancy is a possibility.  Closed head injury, subdural hematoma, or initial stroke is a possibility, suffered six weeks ago.  This cannot be isolated.  The laboratory evaluation for dementia will be pursued. Natalie Mcdermott will arrange for an  MR of the brain and right shoulder.  It is likely that she may have a rotator cuff injury.  We will follow her with you and see what we find. ammonia level checked. - Continued to monitor     Dehydration  -IV fluids     Elevated troponin: Trended down  - Telemetry  - Serial troponins, and CKMB ordered    D/C'd home with [facility]. Advised F/U with PCP 1 - 2 days of discharge.        Consults: GI and neurology    Disposition: home    Patient Instructions:   [unfilled]  Activity: activity as tolerated  Diet: cardiac diet  Wound Care: none needed    Follow-up with PCP in 2 days.     SignedChuck Messenger  1/14/2018  8:05 PM

## 2018-04-11 PROBLEM — R77.8 ELEVATED TROPONIN: Status: RESOLVED | Noted: 2017-12-14 | Resolved: 2018-04-11

## 2018-08-09 ENCOUNTER — HOSPITAL ENCOUNTER (INPATIENT)
Age: 83
LOS: 2 days | Discharge: HOME | DRG: 871 | End: 2018-08-11
Attending: INTERNAL MEDICINE | Admitting: INTERNAL MEDICINE
Payer: MEDICARE

## 2018-08-09 ENCOUNTER — APPOINTMENT (OUTPATIENT)
Dept: GENERAL RADIOLOGY | Age: 83
DRG: 871 | End: 2018-08-09
Payer: MEDICARE

## 2018-08-09 ENCOUNTER — APPOINTMENT (OUTPATIENT)
Dept: ULTRASOUND IMAGING | Age: 83
DRG: 871 | End: 2018-08-09
Payer: MEDICARE

## 2018-08-09 ENCOUNTER — APPOINTMENT (OUTPATIENT)
Dept: CT IMAGING | Age: 83
DRG: 871 | End: 2018-08-09
Payer: MEDICARE

## 2018-08-09 DIAGNOSIS — N39.0 URINARY TRACT INFECTION WITHOUT HEMATURIA, SITE UNSPECIFIED: ICD-10-CM

## 2018-08-09 DIAGNOSIS — E86.0 DEHYDRATION: ICD-10-CM

## 2018-08-09 DIAGNOSIS — R41.82 ALTERED MENTAL STATUS, UNSPECIFIED ALTERED MENTAL STATUS TYPE: Primary | ICD-10-CM

## 2018-08-09 DIAGNOSIS — D69.6 THROMBOCYTOPENIA (HCC): ICD-10-CM

## 2018-08-09 DIAGNOSIS — E87.20 LACTIC ACIDOSIS: ICD-10-CM

## 2018-08-09 PROBLEM — A41.9 SEPSIS (HCC): Status: ACTIVE | Noted: 2018-08-09

## 2018-08-09 LAB
ALBUMIN SERPL-MCNC: 3.1 G/DL (ref 3.9–4.9)
ALP BLD-CCNC: 104 U/L (ref 40–130)
ALT SERPL-CCNC: 8 U/L (ref 0–33)
ANION GAP SERPL CALCULATED.3IONS-SCNC: 13 MEQ/L (ref 7–13)
ANISOCYTOSIS: ABNORMAL
AST SERPL-CCNC: 30 U/L (ref 0–35)
BACTERIA: ABNORMAL /HPF
BASOPHILS ABSOLUTE: 0 K/UL (ref 0–0.2)
BASOPHILS RELATIVE PERCENT: 0.3 %
BILIRUB SERPL-MCNC: 2.2 MG/DL (ref 0–1.2)
BILIRUBIN URINE: NEGATIVE
BLOOD, URINE: NEGATIVE
BUN BLDV-MCNC: 13 MG/DL (ref 8–23)
CALCIUM SERPL-MCNC: 8.7 MG/DL (ref 8.6–10.2)
CHLORIDE BLD-SCNC: 104 MEQ/L (ref 98–107)
CK MB: 2.7 NG/ML (ref 0–3.8)
CLARITY: ABNORMAL
CO2: 22 MEQ/L (ref 22–29)
COLOR: YELLOW
CREAT SERPL-MCNC: 0.76 MG/DL (ref 0.5–0.9)
CREATINE KINASE-MB INDEX: 0.8 % (ref 0–3.5)
EKG ATRIAL RATE: 104 BPM
EKG P AXIS: 70 DEGREES
EKG P-R INTERVAL: 192 MS
EKG Q-T INTERVAL: 394 MS
EKG QRS DURATION: 120 MS
EKG QTC CALCULATION (BAZETT): 518 MS
EKG R AXIS: 5 DEGREES
EKG T AXIS: 117 DEGREES
EKG VENTRICULAR RATE: 104 BPM
EOSINOPHILS ABSOLUTE: 0 K/UL (ref 0–0.7)
EOSINOPHILS RELATIVE PERCENT: 0 %
EPITHELIAL CELLS, UA: ABNORMAL /HPF
FERRITIN: 18.8 NG/ML (ref 13–150)
GFR AFRICAN AMERICAN: >60
GFR NON-AFRICAN AMERICAN: >60
GLOBULIN: 3.5 G/DL (ref 2.3–3.5)
GLUCOSE BLD-MCNC: 111 MG/DL (ref 74–109)
GLUCOSE URINE: NEGATIVE MG/DL
HCT VFR BLD CALC: 31.1 % (ref 37–47)
HEMOGLOBIN: 10.1 G/DL (ref 12–16)
HYPOCHROMIA: ABNORMAL
IRON SATURATION: 20 % (ref 11–46)
IRON: 67 UG/DL (ref 37–145)
KETONES, URINE: NEGATIVE MG/DL
LACTIC ACID, SEPSIS: 1.4 MMOL/L (ref 0.5–1.9)
LACTIC ACID, SEPSIS: 1.6 MMOL/L (ref 0.5–1.9)
LACTIC ACID: 4.2 MMOL/L (ref 0.5–2.2)
LEUKOCYTE ESTERASE, URINE: ABNORMAL
LYMPHOCYTES ABSOLUTE: 0.3 K/UL (ref 1–4.8)
LYMPHOCYTES RELATIVE PERCENT: 4.7 %
MCH RBC QN AUTO: 26 PG (ref 27–31.3)
MCHC RBC AUTO-ENTMCNC: 32.4 % (ref 33–37)
MCV RBC AUTO: 80.3 FL (ref 82–100)
MONOCYTES ABSOLUTE: 0.7 K/UL (ref 0.2–0.8)
MONOCYTES RELATIVE PERCENT: 10.8 %
NEUTROPHILS ABSOLUTE: 5.3 K/UL (ref 1.4–6.5)
NEUTROPHILS RELATIVE PERCENT: 84.2 %
NITRITE, URINE: NEGATIVE
PDW BLD-RTO: 22.5 % (ref 11.5–14.5)
PH UA: 6 (ref 5–9)
PLATELET # BLD: 96 K/UL (ref 130–400)
PLATELET SLIDE REVIEW: ABNORMAL
POTASSIUM SERPL-SCNC: 4 MEQ/L (ref 3.5–5.1)
PROTEIN UA: NEGATIVE MG/DL
RBC # BLD: 3.88 M/UL (ref 4.2–5.4)
RBC UA: ABNORMAL /HPF (ref 0–2)
RENAL EPITHELIAL, UA: ABNORMAL /HPF
SLIDE REVIEW: ABNORMAL
SODIUM BLD-SCNC: 139 MEQ/L (ref 132–144)
SPECIFIC GRAVITY UA: 1.02 (ref 1–1.03)
TOTAL CK: 358 U/L (ref 0–170)
TOTAL IRON BINDING CAPACITY: 342 UG/DL (ref 178–450)
TOTAL PROTEIN: 6.6 G/DL (ref 6.4–8.1)
TROPONIN: <0.01 NG/ML (ref 0–0.01)
URINE REFLEX TO CULTURE: YES
UROBILINOGEN, URINE: 0.2 E.U./DL
WBC # BLD: 6.3 K/UL (ref 4.8–10.8)
WBC UA: ABNORMAL /HPF (ref 0–5)

## 2018-08-09 PROCEDURE — 6370000000 HC RX 637 (ALT 250 FOR IP): Performed by: PHYSICIAN ASSISTANT

## 2018-08-09 PROCEDURE — 84484 ASSAY OF TROPONIN QUANT: CPT

## 2018-08-09 PROCEDURE — 6370000000 HC RX 637 (ALT 250 FOR IP): Performed by: INTERNAL MEDICINE

## 2018-08-09 PROCEDURE — 87077 CULTURE AEROBIC IDENTIFY: CPT

## 2018-08-09 PROCEDURE — 93005 ELECTROCARDIOGRAM TRACING: CPT

## 2018-08-09 PROCEDURE — 83550 IRON BINDING TEST: CPT

## 2018-08-09 PROCEDURE — 82728 ASSAY OF FERRITIN: CPT

## 2018-08-09 PROCEDURE — 80053 COMPREHEN METABOLIC PANEL: CPT

## 2018-08-09 PROCEDURE — 83605 ASSAY OF LACTIC ACID: CPT

## 2018-08-09 PROCEDURE — 96365 THER/PROPH/DIAG IV INF INIT: CPT

## 2018-08-09 PROCEDURE — 2580000003 HC RX 258: Performed by: INTERNAL MEDICINE

## 2018-08-09 PROCEDURE — 36415 COLL VENOUS BLD VENIPUNCTURE: CPT

## 2018-08-09 PROCEDURE — 83540 ASSAY OF IRON: CPT

## 2018-08-09 PROCEDURE — 70450 CT HEAD/BRAIN W/O DYE: CPT

## 2018-08-09 PROCEDURE — 82553 CREATINE MB FRACTION: CPT

## 2018-08-09 PROCEDURE — 1210000000 HC MED SURG R&B

## 2018-08-09 PROCEDURE — 87040 BLOOD CULTURE FOR BACTERIA: CPT

## 2018-08-09 PROCEDURE — 6360000002 HC RX W HCPCS: Performed by: PHYSICIAN ASSISTANT

## 2018-08-09 PROCEDURE — 81001 URINALYSIS AUTO W/SCOPE: CPT

## 2018-08-09 PROCEDURE — 2580000003 HC RX 258: Performed by: PHYSICIAN ASSISTANT

## 2018-08-09 PROCEDURE — 93971 EXTREMITY STUDY: CPT

## 2018-08-09 PROCEDURE — 85025 COMPLETE CBC W/AUTO DIFF WBC: CPT

## 2018-08-09 PROCEDURE — 87086 URINE CULTURE/COLONY COUNT: CPT

## 2018-08-09 PROCEDURE — 71045 X-RAY EXAM CHEST 1 VIEW: CPT

## 2018-08-09 PROCEDURE — 82550 ASSAY OF CK (CPK): CPT

## 2018-08-09 PROCEDURE — 87186 SC STD MICRODIL/AGAR DIL: CPT

## 2018-08-09 PROCEDURE — 99285 EMERGENCY DEPT VISIT HI MDM: CPT

## 2018-08-09 RX ORDER — ATORVASTATIN CALCIUM 20 MG/1
20 TABLET, FILM COATED ORAL DAILY
COMMUNITY

## 2018-08-09 RX ORDER — 0.9 % SODIUM CHLORIDE 0.9 %
30 INTRAVENOUS SOLUTION INTRAVENOUS ONCE
Status: DISCONTINUED | OUTPATIENT
Start: 2018-08-09 | End: 2018-08-11 | Stop reason: HOSPADM

## 2018-08-09 RX ORDER — SODIUM CHLORIDE 0.9 % (FLUSH) 0.9 %
10 SYRINGE (ML) INJECTION EVERY 12 HOURS SCHEDULED
Status: DISCONTINUED | OUTPATIENT
Start: 2018-08-09 | End: 2018-08-11 | Stop reason: HOSPADM

## 2018-08-09 RX ORDER — METOPROLOL SUCCINATE 25 MG/1
25 TABLET, EXTENDED RELEASE ORAL EVERY 12 HOURS
Status: DISCONTINUED | OUTPATIENT
Start: 2018-08-09 | End: 2018-08-11 | Stop reason: HOSPADM

## 2018-08-09 RX ORDER — METOPROLOL SUCCINATE 25 MG/1
25 TABLET, EXTENDED RELEASE ORAL EVERY 12 HOURS
COMMUNITY

## 2018-08-09 RX ORDER — ISOSORBIDE MONONITRATE 30 MG/1
30 TABLET, EXTENDED RELEASE ORAL DAILY
Status: DISCONTINUED | OUTPATIENT
Start: 2018-08-09 | End: 2018-08-11 | Stop reason: HOSPADM

## 2018-08-09 RX ORDER — SODIUM CHLORIDE 9 MG/ML
INJECTION, SOLUTION INTRAVENOUS CONTINUOUS
Status: DISCONTINUED | OUTPATIENT
Start: 2018-08-09 | End: 2018-08-09

## 2018-08-09 RX ORDER — FAMOTIDINE 20 MG/1
20 TABLET, FILM COATED ORAL DAILY
Status: DISCONTINUED | OUTPATIENT
Start: 2018-08-09 | End: 2018-08-09

## 2018-08-09 RX ORDER — SODIUM CHLORIDE, SODIUM LACTATE, POTASSIUM CHLORIDE, AND CALCIUM CHLORIDE .6; .31; .03; .02 G/100ML; G/100ML; G/100ML; G/100ML
500 INJECTION, SOLUTION INTRAVENOUS ONCE
Status: COMPLETED | OUTPATIENT
Start: 2018-08-09 | End: 2018-08-10

## 2018-08-09 RX ORDER — ISOSORBIDE MONONITRATE 30 MG/1
30 TABLET, EXTENDED RELEASE ORAL DAILY
COMMUNITY

## 2018-08-09 RX ORDER — ACETAMINOPHEN 500 MG
1000 TABLET ORAL ONCE
Status: COMPLETED | OUTPATIENT
Start: 2018-08-09 | End: 2018-08-09

## 2018-08-09 RX ORDER — ACETAMINOPHEN 325 MG/1
650 TABLET ORAL EVERY 4 HOURS PRN
Status: DISCONTINUED | OUTPATIENT
Start: 2018-08-09 | End: 2018-08-11 | Stop reason: HOSPADM

## 2018-08-09 RX ORDER — SODIUM CHLORIDE 0.9 % (FLUSH) 0.9 %
10 SYRINGE (ML) INJECTION PRN
Status: DISCONTINUED | OUTPATIENT
Start: 2018-08-09 | End: 2018-08-11 | Stop reason: HOSPADM

## 2018-08-09 RX ORDER — ATORVASTATIN CALCIUM 20 MG/1
20 TABLET, FILM COATED ORAL DAILY
Status: DISCONTINUED | OUTPATIENT
Start: 2018-08-09 | End: 2018-08-11 | Stop reason: HOSPADM

## 2018-08-09 RX ORDER — SODIUM CHLORIDE, SODIUM LACTATE, POTASSIUM CHLORIDE, CALCIUM CHLORIDE 600; 310; 30; 20 MG/100ML; MG/100ML; MG/100ML; MG/100ML
INJECTION, SOLUTION INTRAVENOUS CONTINUOUS
Status: DISCONTINUED | OUTPATIENT
Start: 2018-08-09 | End: 2018-08-11

## 2018-08-09 RX ORDER — ONDANSETRON 2 MG/ML
4 INJECTION INTRAMUSCULAR; INTRAVENOUS EVERY 6 HOURS PRN
Status: DISCONTINUED | OUTPATIENT
Start: 2018-08-09 | End: 2018-08-11 | Stop reason: HOSPADM

## 2018-08-09 RX ORDER — PANTOPRAZOLE SODIUM 40 MG/1
40 TABLET, DELAYED RELEASE ORAL DAILY
Status: DISCONTINUED | OUTPATIENT
Start: 2018-08-09 | End: 2018-08-11 | Stop reason: HOSPADM

## 2018-08-09 RX ORDER — DOCUSATE SODIUM 100 MG/1
100 CAPSULE, LIQUID FILLED ORAL 2 TIMES DAILY
Status: DISCONTINUED | OUTPATIENT
Start: 2018-08-09 | End: 2018-08-11 | Stop reason: HOSPADM

## 2018-08-09 RX ORDER — POTASSIUM CHLORIDE 7.45 MG/ML
10 INJECTION INTRAVENOUS PRN
Status: DISCONTINUED | OUTPATIENT
Start: 2018-08-09 | End: 2018-08-11 | Stop reason: HOSPADM

## 2018-08-09 RX ADMIN — ACETAMINOPHEN 1000 MG: 500 TABLET ORAL at 09:58

## 2018-08-09 RX ADMIN — ACETAMINOPHEN 650 MG: 325 TABLET ORAL at 21:41

## 2018-08-09 RX ADMIN — DOCUSATE SODIUM 100 MG: 100 CAPSULE, LIQUID FILLED ORAL at 21:30

## 2018-08-09 RX ADMIN — ATORVASTATIN CALCIUM 20 MG: 20 TABLET, FILM COATED ORAL at 21:30

## 2018-08-09 RX ADMIN — SODIUM CHLORIDE, POTASSIUM CHLORIDE, SODIUM LACTATE AND CALCIUM CHLORIDE 500 ML: 600; 310; 30; 20 INJECTION, SOLUTION INTRAVENOUS at 21:30

## 2018-08-09 RX ADMIN — CEFTRIAXONE SODIUM 1 G: 1 INJECTION, POWDER, FOR SOLUTION INTRAMUSCULAR; INTRAVENOUS at 11:43

## 2018-08-09 RX ADMIN — SODIUM CHLORIDE: 9 INJECTION, SOLUTION INTRAVENOUS at 11:45

## 2018-08-09 RX ADMIN — SODIUM CHLORIDE: 9 INJECTION, SOLUTION INTRAVENOUS at 14:17

## 2018-08-09 RX ADMIN — METOPROLOL SUCCINATE 25 MG: 25 TABLET, EXTENDED RELEASE ORAL at 21:30

## 2018-08-09 RX ADMIN — SODIUM CHLORIDE 100 ML/HR: 9 INJECTION, SOLUTION INTRAVENOUS at 09:48

## 2018-08-09 RX ADMIN — SODIUM CHLORIDE, POTASSIUM CHLORIDE, SODIUM LACTATE AND CALCIUM CHLORIDE: 600; 310; 30; 20 INJECTION, SOLUTION INTRAVENOUS at 21:31

## 2018-08-09 RX ADMIN — FAMOTIDINE 20 MG: 20 TABLET ORAL at 14:49

## 2018-08-09 ASSESSMENT — ENCOUNTER SYMPTOMS
WHEEZING: 0
NAUSEA: 0
ABDOMINAL DISTENTION: 0
CONSTIPATION: 0
STRIDOR: 0
SHORTNESS OF BREATH: 0
ABDOMINAL PAIN: 0
COUGH: 0
VOMITING: 0
EYE DISCHARGE: 0
SORE THROAT: 0
COLOR CHANGE: 0
RHINORRHEA: 0

## 2018-08-09 ASSESSMENT — PAIN SCALES - GENERAL
PAINLEVEL_OUTOF10: 0

## 2018-08-09 NOTE — ED PROVIDER NOTES
[facility] ED  eMERGENCY dEPARTMENT eNCOUnter      Pt Name: Silvia Leach  MRN: [id number]  [place] 1935  Date of evaluation: 8/9/2018  Provider: Sadiq Moscoso PA-C    CHIEF COMPLAINT       Chief Complaint   Patient presents with    Altered Mental Status     pt was standing holding on to surroundings & shaking per squad, alert & oriented x2. HISTORY OF PRESENT ILLNESS   (Location/Symptom, Timing/Onset, Context/Setting, Quality, Duration, Modifying Factors, Severity)  Note limiting factors. Silvia Leach is a 81 y.o. female who presents to the emergency department With a complaint of confusion that  states he noticed at 8 AM this morning. He states that she had gone to bed early last evening about 6:00pm,when she got up this morning  he went to check on her she was standing beside the bed and she seemed very confused, he said that she wet herself and had a bowel movement, he states he tried to get her to the bathroom but she seemed confused as if she did not know where to go. He states he did not notice any weakness there was no facial droop or slurred speech she was able to move all her extremities well . he states that there has been no previous problems prior to this morning, she had been eating and drinking well. HPI    Nursing Notes were reviewed. REVIEW OF SYSTEMS    (2-9 systems for level 4, 10 or more for level 5)     Review of Systems   Constitutional: Positive for chills and fever. Negative for activity change, appetite change and fatigue. HENT: Negative for congestion, ear discharge, ear pain, nosebleeds, rhinorrhea and sore throat. Eyes: Negative for discharge. Respiratory: Negative for cough, shortness of breath, wheezing and stridor. Cardiovascular: Negative for chest pain, palpitations and leg swelling. Gastrointestinal: Negative for abdominal distention, abdominal pain, constipation, nausea and vomiting.    Genitourinary: Negative for Yes  Interval: Baseline  Level of Consciousness (1a. ): Alert  LOC Questions (1b. ): Answers one correctly  LOC Commands (1c. ): Obeys both correctly  Best Gaze (2. ): Normal  Visual (3. ): No visual loss  Facial Palsy (4. ): Normal  Motor Arm, Left (5a. ): No drift  Motor Arm, Right (5b. ): No drift  Motor Leg, Left (6a. ): No drift  Motor Leg, Right (6b. ): No drift  Limb Ataxia (7. ): Absent  Sensory (8. ): Normal  Best Language (9. ): No aphasia  Dysarthria (10. ): Normal  Extinction and Inattention (11): No neglect  Total: 1Glasgow Coma Scale  Eye Opening: Spontaneous  Best Verbal Response: Confused  Best Motor Response: Obeys commands  [place] Coma Scale Score: 14        PHYSICAL EXAM    (up to 7 for level 4, 8 or more for level 5)     ED Triage Vitals [08/09/18 0935]   BP Temp Temp Source Pulse Resp SpO2 Height Weight   (!) 141/122 100.8 °F (38.2 °C) Oral 106 13 94 % -- 140 lb (63.5 kg)       Physical Exam   Constitutional: She appears well-developed and well-nourished. HENT:   Head: Normocephalic. Dry oral mucosa   Eyes: EOM are normal. Pupils are equal, round, and reactive to light. Pupils are 3 mm equal round and reactive no nystagmus   Neck: Normal range of motion. Neck supple. No JVD present. No tracheal deviation present. Cardiovascular: Normal rate. Pulmonary/Chest: Effort normal and breath sounds normal. No respiratory distress. She has no wheezes. She has no rales. She exhibits no tenderness. Abdominal: Soft. Bowel sounds are normal. She exhibits no distension and no mass. There is no tenderness. There is no rebound and no guarding. Musculoskeletal: Normal range of motion. She exhibits no edema or deformity. Moving all extremity well no drift of upper or lower extremities   Neurological: She is alert. Coordination normal.   Agent is alert she knows her name she does her location she does not know the year she cannot to me what season we are and she seems mildly confused this time. She is moving all her extremities well there is no slurred speech or facial droop   Skin: Skin is warm and dry. Psychiatric:   Confused       DIAGNOSTIC RESULTS     EKG: All EKG's are interpreted by the Emergency Department Physician who either signs or Co-signs this chart in the absence of a cardiologist.    EKG shows sinus tachycardia with occasional and consecutive premature ventricular complexes a rate of 104 bpm there is T-wave inversions in leads 1 aVL V5 and V6, EKG is similar presentation to previous EKG of 12/14/2017. RADIOLOGY:   Non-plain film images such as CT, Ultrasound and MRI are read by the radiologist. Plain radiographic images are visualized and preliminarily interpreted by the emergency physician with the below findings:    Chest x-ray shows no acute pulmonary process. CT brain: cerebral atrophy with age related findings. No ct evidence of acute cva or intracranial hemorrhage. No significant change since 12/14/2017    Interpretation per the Radiologist below, if available at the time of this note:    XR CHEST PORTABLE   Preliminary Result   POOR INSPIRATORY EFFORT. NO ACTIVE LUNG DISEASE. CT Head WO Contrast   Final Result   1. CEREBRAL ATROPHY AND AGE RELATED FINDINGS IN THE BRAIN. 2. PATCHY WHITE MATTER HYPOATTENUATION LIKELY A SEQUELA OF SMALL VESSEL DISEASE. 3. NO CT EVIDENCE OF AN ACUTE CVA OR INTRACRANIAL HEMORRHAGE. 4. NO ACUTE INTRA-AXIAL OR EXTRA-AXIAL FINDINGS IN THE BRAIN. 5. NO SIGNIFICANT INTERVAL CHANGE SINCE THE 12/14/2017 CT BRAIN. All CT scans at this facility use dose modulation, iterative reconstruction, and/or weight based dosing when appropriate to reduce radiation dose to as low as reasonably achievable.                 ED BEDSIDE ULTRASOUND:   Performed by ED Physician - none    LABS:  Labs Reviewed   COMPREHENSIVE METABOLIC PANEL - Abnormal; Notable for the following:        Result Value    Glucose 111 (*)     Alb 3.1 (*)     Total consistent with that of urinary tract infection she was started on Rocephin here in the emergency department. Patient was clinically dehydrated dry oral mucosa my after hydration she states she did start feeling better and was more alert. He should be admitted into the hospital for altered mental status him a urinary tract infection, dehydration. I did speak with Dr Luanne Queen the [facility] he will accept admission. CRITICAL CARE TIME   Total Critical Care time was 0 minutes, excluding separately reportable procedures. There was a high probability of clinically significant/life threatening deterioration in the patient's condition which required my urgent intervention. CONSULTS:  IP CONSULT TO HOSPITALIST    PROCEDURES:  Unless otherwise noted below, none     Procedures    FINAL IMPRESSION      1. Altered mental status, unspecified altered mental status type    2. Dehydration    3. Lactic acidosis    4. Urinary tract infection without hematuria, site unspecified          DISPOSITION/PLAN   DISPOSITION Decision To Admit 08/09/2018 11:27:11 AM      PATIENT REFERRED TO:  No follow-up provider specified.     DISCHARGE MEDICATIONS:  New Prescriptions    No medications on file          (Please note that portions of this note were completed with a voice recognition program.  Efforts were made to edit the dictations but occasionally words are mis-transcribed.)    Paige Alba PA-C (electronically signed)  Attending Emergency Physician         Paige Alba PA-C  08/09/18 0274

## 2018-08-09 NOTE — PROGRESS NOTES
Admission assessment documented. Pt is A+O X4 at this time. Denies any pain. Skin intact. Swelling noted to BLE. Up X1 with walker. hipaa code provided to pt and family. Meds verbally verified by pt and daughter. Mc Perez NP notified of this. Meds given per order. Denies any needs at this time. Falls precautions put I place. Light in reach. Hourly rounding explained. Will monitor.  Electronically signed by Rafat Lozano RN on 8/9/2018 at 3:29 PM

## 2018-08-09 NOTE — PROGRESS NOTES
Patient bathed and changed linens. Patient has been incontinent here as well as at home. Skin intact no redness noted.

## 2018-08-09 NOTE — PROGRESS NOTES
Called report to [facility]. Bed is process of being cleaned at this time. Patient will be sent in the next 10 minutes.

## 2018-08-09 NOTE — H&P
Authorizing Provider   metoprolol succinate (TOPROL XL) 25 MG extended release tablet Take 25 mg by mouth every 12 hours   Yes Historical Provider, MD   isosorbide mononitrate (IMDUR) 30 MG extended release tablet Take 30 mg by mouth daily   Yes Historical Provider, MD   atorvastatin (LIPITOR) 20 MG tablet Take 20 mg by mouth daily   Yes Historical Provider, MD   pantoprazole (PROTONIX) 40 MG tablet Take 1 tablet by mouth daily 12/17/17 8/9/18 Yes Gwen Nichole MD       ALLERGIES: Lime flavor [flavoring agent]    REVIEW OF SYSTEM:   ROS as noted in HPI, 12 point ROS reviewed and otherwise negative. OBJECTIVE  PHYSICAL EXAM: BP (!) 122/48   Pulse 81   Temp 98.6 °F (37 °C) (Oral)   Resp 18   Ht 5' 4\" (1.626 m)   Wt 134 lb (60.8 kg)   SpO2 98%   BMI 23.00 kg/m²     CONSTITUTIONAL:  alert and mild distress  EYES:  pupils equal, round and reactive to light, sclera clear and conjunctiva normal  ENT:  normocepalic, without obvious abnormality, atraumatic, upper and lower dentures, dry mucosa  NECK:  supple, symmetrical, trachea midline and skin normal  LUNGS:  no increased work of breathing and clear to auscultation  CARDIOVASCULAR:  normal apical pulses and normal S1 and S2  ABDOMEN:  normal bowel sounds and non-tender  MUSCULOSKELETAL:  Ambulates w/walker, 2+ BLE edema + distal pulses  NEUROLOGIC:  Mental Status Exam:  Level of Alertness:   awake  Orientation:   person, place  Memory:   abnormal - intermittently confused  Cranial Nerves:  cranial nerves II-XII are grossly intact  SKIN:  normal skin color, texture, turgor    DATA:     Diagnostic tests reviewed for today's visit:    Most recent labs and imaging results reviewed.      LABS:    Recent Results (from the past 24 hour(s))   Comprehensive Metabolic Panel    Collection Time: 08/09/18  9:39 AM   Result Value Ref Range    Sodium 139 132 - 144 mEq/L    Potassium 4.0 3.5 - 5.1 mEq/L    Chloride 104 98 - 107 mEq/L    CO2 22 22 - 29 mEq/L    Anion Gap 13 7 -

## 2018-08-09 NOTE — ED TRIAGE NOTES
7374- pt to room 15 via [facility] for reported call of fall, pt was standing on their arrival & holding on to surroundings & very shaky, alert, oriented to x2 , dodson x4 = & spont, no arm drift, pupils equal, lips dry & chapped, incontinent of greenish stool with undigested food & urine, pt cleansed & straight cathed for urine sample, tolerated well; Sharri Jaime

## 2018-08-10 LAB
ANION GAP SERPL CALCULATED.3IONS-SCNC: 8 MEQ/L (ref 7–13)
ANISOCYTOSIS: ABNORMAL
ATYPICAL LYMPHOCYTE RELATIVE PERCENT: 1 %
BANDED NEUTROPHILS RELATIVE PERCENT: 3 % (ref 5–11)
BASOPHILS ABSOLUTE: 0 K/UL (ref 0–0.2)
BASOPHILS RELATIVE PERCENT: 0.6 %
BUN BLDV-MCNC: 12 MG/DL (ref 8–23)
CALCIUM SERPL-MCNC: 8.1 MG/DL (ref 8.6–10.2)
CHLORIDE BLD-SCNC: 109 MEQ/L (ref 98–107)
CO2: 24 MEQ/L (ref 22–29)
CREAT SERPL-MCNC: 0.52 MG/DL (ref 0.5–0.9)
EOSINOPHILS ABSOLUTE: 0 K/UL (ref 0–0.7)
EOSINOPHILS RELATIVE PERCENT: 0.7 %
GFR AFRICAN AMERICAN: >60
GFR NON-AFRICAN AMERICAN: >60
GLUCOSE BLD-MCNC: 85 MG/DL (ref 74–109)
HCT VFR BLD CALC: 25.9 % (ref 37–47)
HEMOGLOBIN: 8.3 G/DL (ref 12–16)
HYPOCHROMIA: ABNORMAL
LV EF: 60 %
LVEF MODALITY: NORMAL
LYMPHOCYTES ABSOLUTE: 0.4 K/UL (ref 1–4.8)
LYMPHOCYTES RELATIVE PERCENT: 12 %
MACROCYTES: ABNORMAL
MAGNESIUM: 1.4 MG/DL (ref 1.7–2.3)
MCH RBC QN AUTO: 25.6 PG (ref 27–31.3)
MCHC RBC AUTO-ENTMCNC: 32.1 % (ref 33–37)
MCV RBC AUTO: 79.7 FL (ref 82–100)
METAMYELOCYTES RELATIVE PERCENT: 1 %
MICROCYTES: ABNORMAL
MONOCYTES ABSOLUTE: 0.1 K/UL (ref 0.2–0.8)
MONOCYTES RELATIVE PERCENT: 3.9 %
NEUTROPHILS ABSOLUTE: 2.6 K/UL (ref 1.4–6.5)
NEUTROPHILS RELATIVE PERCENT: 80 %
PDW BLD-RTO: 22.4 % (ref 11.5–14.5)
PLATELET # BLD: 79 K/UL (ref 130–400)
PLATELET SLIDE REVIEW: ABNORMAL
POTASSIUM REFLEX MAGNESIUM: 3.4 MEQ/L (ref 3.5–5.1)
RBC # BLD: 3.25 M/UL (ref 4.2–5.4)
SLIDE REVIEW: ABNORMAL
SMUDGE CELLS: 1
SODIUM BLD-SCNC: 141 MEQ/L (ref 132–144)
WBC # BLD: 3.1 K/UL (ref 4.8–10.8)

## 2018-08-10 PROCEDURE — 99222 1ST HOSP IP/OBS MODERATE 55: CPT | Performed by: INTERNAL MEDICINE

## 2018-08-10 PROCEDURE — 6360000002 HC RX W HCPCS: Performed by: PHYSICIAN ASSISTANT

## 2018-08-10 PROCEDURE — G8978 MOBILITY CURRENT STATUS: HCPCS

## 2018-08-10 PROCEDURE — 97535 SELF CARE MNGMENT TRAINING: CPT

## 2018-08-10 PROCEDURE — 1210000000 HC MED SURG R&B

## 2018-08-10 PROCEDURE — 2580000003 HC RX 258: Performed by: PHYSICIAN ASSISTANT

## 2018-08-10 PROCEDURE — 80048 BASIC METABOLIC PNL TOTAL CA: CPT

## 2018-08-10 PROCEDURE — 6370000000 HC RX 637 (ALT 250 FOR IP): Performed by: INTERNAL MEDICINE

## 2018-08-10 PROCEDURE — 93010 ELECTROCARDIOGRAM REPORT: CPT | Performed by: INTERNAL MEDICINE

## 2018-08-10 PROCEDURE — 83735 ASSAY OF MAGNESIUM: CPT

## 2018-08-10 PROCEDURE — 97162 PT EVAL MOD COMPLEX 30 MIN: CPT

## 2018-08-10 PROCEDURE — 93306 TTE W/DOPPLER COMPLETE: CPT

## 2018-08-10 PROCEDURE — 36415 COLL VENOUS BLD VENIPUNCTURE: CPT

## 2018-08-10 PROCEDURE — G8979 MOBILITY GOAL STATUS: HCPCS

## 2018-08-10 PROCEDURE — 2580000003 HC RX 258: Performed by: INTERNAL MEDICINE

## 2018-08-10 PROCEDURE — 85025 COMPLETE CBC W/AUTO DIFF WBC: CPT

## 2018-08-10 RX ORDER — ASPIRIN 81 MG/1
81 TABLET ORAL DAILY
Status: DISCONTINUED | OUTPATIENT
Start: 2018-08-10 | End: 2018-08-11 | Stop reason: HOSPADM

## 2018-08-10 RX ADMIN — ISOSORBIDE MONONITRATE 30 MG: 30 TABLET, EXTENDED RELEASE ORAL at 08:21

## 2018-08-10 RX ADMIN — ASPIRIN 81 MG: 81 TABLET, COATED ORAL at 10:31

## 2018-08-10 RX ADMIN — METOPROLOL SUCCINATE 25 MG: 25 TABLET, EXTENDED RELEASE ORAL at 08:21

## 2018-08-10 RX ADMIN — METOPROLOL SUCCINATE 25 MG: 25 TABLET, EXTENDED RELEASE ORAL at 20:44

## 2018-08-10 RX ADMIN — CEFTRIAXONE SODIUM 1 G: 1 INJECTION, POWDER, FOR SOLUTION INTRAMUSCULAR; INTRAVENOUS at 11:45

## 2018-08-10 RX ADMIN — SODIUM CHLORIDE, POTASSIUM CHLORIDE, SODIUM LACTATE AND CALCIUM CHLORIDE: 600; 310; 30; 20 INJECTION, SOLUTION INTRAVENOUS at 23:00

## 2018-08-10 RX ADMIN — ATORVASTATIN CALCIUM 20 MG: 20 TABLET, FILM COATED ORAL at 20:44

## 2018-08-10 RX ADMIN — Medication 10 ML: at 08:21

## 2018-08-10 RX ADMIN — PANTOPRAZOLE SODIUM 40 MG: 40 TABLET, DELAYED RELEASE ORAL at 08:21

## 2018-08-10 RX ADMIN — SODIUM CHLORIDE, POTASSIUM CHLORIDE, SODIUM LACTATE AND CALCIUM CHLORIDE: 600; 310; 30; 20 INJECTION, SOLUTION INTRAVENOUS at 13:54

## 2018-08-10 ASSESSMENT — ENCOUNTER SYMPTOMS
CHEST TIGHTNESS: 0
COUGH: 0
NAUSEA: 0
GASTROINTESTINAL NEGATIVE: 1
WHEEZING: 0
SHORTNESS OF BREATH: 0
BLOOD IN STOOL: 0
RESPIRATORY NEGATIVE: 1
EYES NEGATIVE: 1
STRIDOR: 0

## 2018-08-10 ASSESSMENT — PAIN SCALES - GENERAL: PAINLEVEL_OUTOF10: 0

## 2018-08-10 NOTE — PROGRESS NOTES
Time:   Individual   Time In 0204   Time Out 0231   Minutes 27         Gait: 9 min   Sukhwinder Phillip, PT, 08/10/18 at 2:35 PM

## 2018-08-10 NOTE — PROGRESS NOTES
Hospitalist Progress Note      PCP: Ky Pickens MD    Date of Admission: 8/9/2018    Chief Complaint:    Chief Complaint   Patient presents with    Altered Mental Status     pt was standing holding on to surroundings & shaking per squad, alert & oriented x2. Subjective:  Patient denies fevers, chills, sweats; did have some irritation with urinating last night. 12 point ROS negative other than mentioned above     Medications:  Reviewed    Infusion Medications    lactated ringers 100 mL/hr at 08/09/18 2131     Scheduled Medications    aspirin  81 mg Oral Daily    sodium chloride  30 mL/kg Intravenous Once    sodium chloride flush  10 mL Intravenous 2 times per day    docusate sodium  100 mg Oral BID    cefTRIAXone (ROCEPHIN) IV  1 g Intravenous Q24H    sodium chloride flush  10 mL Intravenous 2 times per day    atorvastatin  20 mg Oral Daily    isosorbide mononitrate  30 mg Oral Daily    metoprolol succinate  25 mg Oral Q12H    pantoprazole  40 mg Oral Daily     PRN Meds: sodium chloride flush, magnesium hydroxide, ondansetron, potassium chloride, acetaminophen, sodium chloride flush      Intake/Output Summary (Last 24 hours) at 08/10/18 1056  Last data filed at 08/09/18 2048   Gross per 24 hour   Intake              601 ml   Output              650 ml   Net              -49 ml       Exam:    BP (!) 135/36   Pulse 64   Temp 98.2 °F (36.8 °C) (Oral)   Resp 16   Ht 5' 4\" (1.626 m)   Wt 134 lb (60.8 kg)   SpO2 98%   BMI 23.00 kg/m²     General appearance: No apparent distress, appears stated age and cooperative. HEENT: Pupils equal, round, and reactive to light. Conjunctivae/corneas clear. Neck: Supple, with full range of motion. No jugular venous distention. Trachea midline. Respiratory:  Normal respiratory effort. Clear to auscultation, bilaterally without Rales/Wheezes/Rhonchi. Cardiovascular: Regular rate and rhythm with normal S1/S2 without murmurs, rubs or gallops.   Abdomen: Soft, non-tender, non-distended with normal bowel sounds. Musculoskeletal: No clubbing, cyanosis, bilateral +1 pitting edema with L > R  Neuro: Non Focal.   Capillary Refill: Brisk,< 3 seconds   Peripheral Pulses: +2 palpable, equal bilaterally     Labs:   Recent Labs      08/09/18   0939  08/10/18   0639   WBC  6.3  3.1*   HGB  10.1*  8.3*   HCT  31.1*  25.9*   PLT  96*  79*     Recent Labs      08/09/18   0939  08/10/18   0639   NA  139  141   K  4.0  3.4*   CL  104  109*   CO2  22  24   BUN  13  12   CREATININE  0.76  0.52   CALCIUM  8.7  8.1*     Recent Labs      08/09/18 0939   AST  30   ALT  8   BILITOT  2.2*   ALKPHOS  104     No results for input(s): INR in the last 72 hours. Recent Labs      08/09/18 0939   CKTOTAL  358*   TROPONINI  <0.010       Urinalysis:    Lab Results   Component Value Date    NITRU Negative 08/09/2018    WBCUA  08/09/2018    BACTERIA Many 08/09/2018    RBCUA 3-5 08/09/2018    BLOODU Negative 08/09/2018    SPECGRAV 1.018 08/09/2018    GLUCOSEU Negative 08/09/2018       Radiology:  US DUP LOWER EXTREMITY LEFT JIMENEZ   Final Result      US DUP LOWER EXTREMITY RIGHT JIMENEZ   Final Result      XR CHEST PORTABLE   Final Result   POOR INSPIRATORY EFFORT. NO ACTIVE LUNG DISEASE. CT Head WO Contrast   Final Result   1. CEREBRAL ATROPHY AND AGE RELATED FINDINGS IN THE BRAIN. 2. PATCHY WHITE MATTER HYPOATTENUATION LIKELY A SEQUELA OF SMALL VESSEL DISEASE. 3. NO CT EVIDENCE OF AN ACUTE CVA OR INTRACRANIAL HEMORRHAGE. 4. NO ACUTE INTRA-AXIAL OR EXTRA-AXIAL FINDINGS IN THE BRAIN. 5. NO SIGNIFICANT INTERVAL CHANGE SINCE THE 12/14/2017 CT BRAIN. All CT scans at this facility use dose modulation, iterative reconstruction, and/or weight based dosing when appropriate to reduce radiation dose to as low as reasonably achievable.             Assessment/Plan:    #Sepsis secondary to UTI with metabolic encephalopathy POA      - Improved with fluids and IV Abx; continue IV

## 2018-08-10 NOTE — CONSULTS
Consults    Patient Name: Fer Allsion  Admit Date: 2018  9:20 AM  MR #: [id number]  : 1935    Attending Physician: Dana Valdez MD  Reason for consult: sob cp    History of Presenting Illness:      Fer Allison is a 81 y.o. female on hospital day 1 with a history of . History Obtained From:  patient, electronic medical record  Pt was found to be acutely confused.  called squad. dicovered to have Urosepsis. She has complained of sob and intermittent cp. initail Trop negative  ECG SR with [facility]    She is awake and alert this am  She currently denies cp. No sob at rest.    Echo is being done at bedside. Preliminary EF ~50%    History:      EKG:  Past Medical History:   Diagnosis Date    Arthritis     Encephalopathy     GI bleed     Hypertension     Lactic acidosis     SIRS (systemic inflammatory response syndrome) (HCC)     Ventricular tachycardia (HCC)      Past Surgical History:   Procedure Laterality Date    TONSILLECTOMY      UPPER GASTROINTESTINAL ENDOSCOPY N/A 2017    EGD CONTROL HEMORRHAGE performed by Maciel Morales MD at [address] [place] [address] History  History reviewed. No pertinent family history. [] Unable to obtain due to ventilated and/ or neurologic status    Social History     Social History    Marital status:      Spouse name: N/A    Number of children: N/A    Years of education: N/A     Occupational History    Not on file. Social History Main Topics    Smoking status: Former Smoker    Smokeless tobacco: Never Used    Alcohol use No      Comment: none currently per , Ashley Cooley used to love her beer\".     Drug use: No    Sexual activity: Not Currently     Other Topics Concern    Not on file     Social History Narrative    No narrative on file      [] Unable to obtain due to ventilated and/ or neurologic status      Home Medications:      Prescriptions Prior to Admission: metoprolol succinate (TOPROL XL) 25 MG extended release Examination:    Physical Exam   Constitutional: No distress. She appears chronically ill and acutely ill. HENT:   Normal cephalic and Atraumatic   Eyes: Pupils are equal, round, and reactive to light. Neck: Normal range of motion and thyroid normal. Neck supple. No JVD present. No neck adenopathy. No thyromegaly present. Cardiovascular: Normal rate, regular rhythm, intact distal pulses and normal pulses. Murmur heard. Pulmonary/Chest: Effort normal and breath sounds normal. She has no wheezes. She has no rales. She exhibits no tenderness. Abdominal: Soft. Bowel sounds are normal. There is no tenderness. Musculoskeletal: Normal range of motion. She exhibits no edema or tenderness. Neurological: She is alert and oriented to person, place, and time. Skin: Skin is warm. No cyanosis. Nails show no clubbing.          Results/ Medications reviewed 8/10/2018, 10:09 AM     Laboratory, Microbiology, Pathology, Radiology, Cardiology, Medications and Transcriptions reviewed  Scheduled Meds:   sodium chloride  30 mL/kg Intravenous Once    sodium chloride flush  10 mL Intravenous 2 times per day    docusate sodium  100 mg Oral BID    cefTRIAXone (ROCEPHIN) IV  1 g Intravenous Q24H    sodium chloride flush  10 mL Intravenous 2 times per day    atorvastatin  20 mg Oral Daily    isosorbide mononitrate  30 mg Oral Daily    metoprolol succinate  25 mg Oral Q12H    pantoprazole  40 mg Oral Daily     Continuous Infusions:   lactated ringers 100 mL/hr at 08/09/18 2131       Recent Labs      08/09/18   0939  08/10/18   0639   WBC  6.3  3.1*   HGB  10.1*  8.3*   HCT  31.1*  25.9*   MCV  80.3*  79.7*   PLT  96*  79*     Recent Labs      08/09/18   0939  08/10/18   0639   NA  139  141   K  4.0  3.4*   CL  104  109*   CO2  22  24   BUN  13  12   CREATININE  0.76  0.52     Recent Labs      08/09/18   0939   AST  30   ALT  8   BILITOT  2.2*   ALKPHOS  104     No results for input(s): LIPASE, AMYLASE in the last 72 hours. Recent Labs      08/09/18   0939   PROT  6.6     Ct Head Wo Contrast    Result Date: 8/9/2018  EXAM: CT SCAN OF THE BRAIN WITHOUT CONTRAST COMPARISON: 12/14/2017 REASONS FOR EXAMINATION:     ALTERED MENTAL STATUS, CONFUSION, INCONTINENCE, POSSIBLE CVA TECHNIQUE:  CT brain is obtained without IV contrast agent. FINDINGS: An unenhanced CT scan of the brain demonstrates no evidence of a skull fracture. There is cerebral atrophy and age related findings in the brain. Patchy white matter hypoattenuation is likely a sequela of small vessel disease. There is no acute CVA visualized. There is no acute intra-axial or extra-axial findings in the brain. There is no intracranial mass, hemorrhage, \"mass effect\" or midline shift. The remainder of the CT scan of the brain appears unremarkable. No significant change since the 12/14/2017 CT brain. 1. CEREBRAL ATROPHY AND AGE RELATED FINDINGS IN THE BRAIN. 2. PATCHY WHITE MATTER HYPOATTENUATION LIKELY A SEQUELA OF SMALL VESSEL DISEASE. 3. NO CT EVIDENCE OF AN ACUTE CVA OR INTRACRANIAL HEMORRHAGE. 4. NO ACUTE INTRA-AXIAL OR EXTRA-AXIAL FINDINGS IN THE BRAIN. 5. NO SIGNIFICANT INTERVAL CHANGE SINCE THE 12/14/2017 CT BRAIN. All CT scans at this facility use dose modulation, iterative reconstruction, and/or weight based dosing when appropriate to reduce radiation dose to as low as reasonably achievable. Xr Chest Portable    Result Date: 8/9/2018  EXAMINATION: XR CHEST PORTABLE, ONE VIEW: DATE AND TIME: 8/9/2018 at 9:45 AM. CLINICAL HISTORY: SHORTNESS OF BREATH. CONFUSION. COMPARISONS: None. FINDINGS: Lungs show scattered areas of increased reticular markings consistent with areas of parenchymal scarring. Allowing for the depth of inspiratory effort, lungs are clear of any fresh infiltrate. No overt pulmonary edema. No effusion. No pneumothorax. POOR INSPIRATORY EFFORT. NO ACTIVE LUNG DISEASE.      Us Dup Lower Extremity Left Con    Result Date: 8/9/2018  LEFT LOWER EXTREMITY VENOUS DUPLEX EXAM: REASON FOR EXAMINATION: Left lower extremity edema and shortness of breath. A venous duplex exam of the left lower extremity was obtained. The following veins were evaluated, including the common femoral, femoral, popliteal and calf veins. The veins were evaluated with color Doppler imaging, compression and augmentation if possible. All the veins demonstrated show no evidence for deep venous thrombosis. CONCLUSION: NO EVIDENCE FOR DEEP VENOUS THROMBOSIS FROM THE LEVEL OF THE COMMON FEMORAL VEIN TO THE POPLITEAL FOSSA IN THE LEFT LEG(S). Us Dup Lower Extremity Right Con    Result Date: 8/9/2018  LOWER EXTREMITY VENOUS DUPLEX EXAM: REASON FOR EXAMINATION: Right-sided leg pain and shortness of breath. Right knee pain. Right leg edema. Right leg varicosities. COMPARISON: None. A venous duplex exam of the right lower extremity was obtained. The following veins were evaluated, including the common femoral, femoral, popliteal and calf veins. The veins were evaluated with color Doppler imaging, compression and augmentation if possible. All the veins demonstrated show no evidence for deep venous thrombosis. There is good compression of all veins visualized. CONCLUSION: NO EVIDENCE FOR DEEP VENOUS THROMBOSIS FROM THE LEVEL OF THE THE COMMON FEMORAL  VEIN TO THE POPLITEAL FOSSA. NO DEFINITE CALF VEIN THROMBOSIS IN THE RIGHT LEG(S). Active Hospital Problems    Diagnosis Date Noted    Sepsis [facility]) [A41.9] 08/09/2018     Priority: Low         Impression/Plan:   1. Urosepsis  2. Sob  3. CP  4. Recheck Troponin  5. HypoK - replace  6. HTN stable  7. Add ASA  Continue BB     Thank you for allowing us to participate in the care of this patient. Will continue to follow. Please call if questions or concerns arise.     Electronically signed by Ivory Lynn MD on 8/10/2018 at 10:09 AM

## 2018-08-11 VITALS
OXYGEN SATURATION: 98 % | RESPIRATION RATE: 18 BRPM | HEART RATE: 54 BPM | BODY MASS INDEX: 22.88 KG/M2 | SYSTOLIC BLOOD PRESSURE: 158 MMHG | TEMPERATURE: 98.1 F | WEIGHT: 134 LBS | HEIGHT: 64 IN | DIASTOLIC BLOOD PRESSURE: 50 MMHG

## 2018-08-11 LAB
ANION GAP SERPL CALCULATED.3IONS-SCNC: 10 MEQ/L (ref 7–13)
BASOPHILS ABSOLUTE: 0 K/UL (ref 0–0.2)
BASOPHILS RELATIVE PERCENT: 1.1 %
BUN BLDV-MCNC: 10 MG/DL (ref 8–23)
CALCIUM SERPL-MCNC: 8.1 MG/DL (ref 8.6–10.2)
CHLORIDE BLD-SCNC: 105 MEQ/L (ref 98–107)
CO2: 26 MEQ/L (ref 22–29)
CREAT SERPL-MCNC: 0.5 MG/DL (ref 0.5–0.9)
EOSINOPHILS ABSOLUTE: 0.1 K/UL (ref 0–0.7)
EOSINOPHILS RELATIVE PERCENT: 1.6 %
GFR AFRICAN AMERICAN: >60
GFR NON-AFRICAN AMERICAN: >60
GLUCOSE BLD-MCNC: 94 MG/DL (ref 74–109)
HCT VFR BLD CALC: 26.4 % (ref 37–47)
HEMOGLOBIN: 8.6 G/DL (ref 12–16)
LYMPHOCYTES ABSOLUTE: 0.7 K/UL (ref 1–4.8)
LYMPHOCYTES RELATIVE PERCENT: 19 %
MCH RBC QN AUTO: 25.8 PG (ref 27–31.3)
MCHC RBC AUTO-ENTMCNC: 32.4 % (ref 33–37)
MCV RBC AUTO: 79.6 FL (ref 82–100)
MONOCYTES ABSOLUTE: 0.7 K/UL (ref 0.2–0.8)
MONOCYTES RELATIVE PERCENT: 19.7 %
NEUTROPHILS ABSOLUTE: 2.1 K/UL (ref 1.4–6.5)
NEUTROPHILS RELATIVE PERCENT: 58.6 %
ORGANISM: ABNORMAL
PDW BLD-RTO: 22.4 % (ref 11.5–14.5)
PLATELET # BLD: 92 K/UL (ref 130–400)
POTASSIUM REFLEX MAGNESIUM: 3.6 MEQ/L (ref 3.5–5.1)
RBC # BLD: 3.32 M/UL (ref 4.2–5.4)
SODIUM BLD-SCNC: 141 MEQ/L (ref 132–144)
URINE CULTURE, ROUTINE: ABNORMAL
URINE CULTURE, ROUTINE: ABNORMAL
WBC # BLD: 3.6 K/UL (ref 4.8–10.8)

## 2018-08-11 PROCEDURE — 80048 BASIC METABOLIC PNL TOTAL CA: CPT

## 2018-08-11 PROCEDURE — 99232 SBSQ HOSP IP/OBS MODERATE 35: CPT | Performed by: INTERNAL MEDICINE

## 2018-08-11 PROCEDURE — 6370000000 HC RX 637 (ALT 250 FOR IP): Performed by: INTERNAL MEDICINE

## 2018-08-11 PROCEDURE — 36415 COLL VENOUS BLD VENIPUNCTURE: CPT

## 2018-08-11 PROCEDURE — 85025 COMPLETE CBC W/AUTO DIFF WBC: CPT

## 2018-08-11 PROCEDURE — 6360000002 HC RX W HCPCS: Performed by: PHYSICIAN ASSISTANT

## 2018-08-11 PROCEDURE — 2580000003 HC RX 258: Performed by: PHYSICIAN ASSISTANT

## 2018-08-11 RX ORDER — ASPIRIN 81 MG/1
81 TABLET ORAL DAILY
Qty: 30 TABLET | Refills: 3 | Status: SHIPPED | OUTPATIENT
Start: 2018-08-12

## 2018-08-11 RX ORDER — PSEUDOEPHEDRINE HCL 30 MG
100 TABLET ORAL 2 TIMES DAILY
Qty: 60 CAPSULE | Refills: 1 | Status: SHIPPED | OUTPATIENT
Start: 2018-08-11

## 2018-08-11 RX ORDER — NITROFURANTOIN 25; 75 MG/1; MG/1
100 CAPSULE ORAL 2 TIMES DAILY
Qty: 10 CAPSULE | Refills: 0 | Status: SHIPPED | OUTPATIENT
Start: 2018-08-11 | End: 2018-08-16

## 2018-08-11 RX ORDER — PANTOPRAZOLE SODIUM 40 MG/1
40 TABLET, DELAYED RELEASE ORAL DAILY
Qty: 30 TABLET | Refills: 0 | Status: SHIPPED | OUTPATIENT
Start: 2018-08-12

## 2018-08-11 RX ADMIN — PANTOPRAZOLE SODIUM 40 MG: 40 TABLET, DELAYED RELEASE ORAL at 10:44

## 2018-08-11 RX ADMIN — Medication 10 ML: at 12:01

## 2018-08-11 RX ADMIN — ISOSORBIDE MONONITRATE 30 MG: 30 TABLET, EXTENDED RELEASE ORAL at 10:44

## 2018-08-11 RX ADMIN — ASPIRIN 81 MG: 81 TABLET, COATED ORAL at 10:44

## 2018-08-11 RX ADMIN — METOPROLOL SUCCINATE 25 MG: 25 TABLET, EXTENDED RELEASE ORAL at 10:44

## 2018-08-11 ASSESSMENT — ENCOUNTER SYMPTOMS
APNEA: 0
WHEEZING: 0
NAUSEA: 0
SHORTNESS OF BREATH: 0
CHEST TIGHTNESS: 0
VOICE CHANGE: 0
TROUBLE SWALLOWING: 0
COLOR CHANGE: 0
BLOOD IN STOOL: 0
FACIAL SWELLING: 0
VOMITING: 0
ANAL BLEEDING: 0
ABDOMINAL DISTENTION: 0

## 2018-08-11 ASSESSMENT — PAIN SCALES - GENERAL: PAINLEVEL_OUTOF10: 0

## 2018-08-11 NOTE — PROGRESS NOTES
Reviewed discharge instructions with pt and daughter. Verbalized understanding. Left floor via transport in [facility].   Electronically signed by Chitra Mccray RN on 8/11/2018 at 3:07 PM

## 2018-08-11 NOTE — PROGRESS NOTES
Patient assessment completed. Patient's daughter is at bedside, patient has no complaints or concerns at this time other than being ready to go home. Will continue to monitor. Call light in reach and bed in lowest position.    Electronically signed by Theresa Macias RN on 8/10/2018 at 10:39 PM

## 2018-08-11 NOTE — PROGRESS NOTES
(!) 158/50   Pulse 54   Temp 98.1 °F (36.7 °C) (Oral)   Resp 18   Ht 5' 4\" (1.626 m)   Wt 134 lb (60.8 kg)   SpO2 98%   BMI 23.00 kg/m²    Physical Exam   Constitutional: She is oriented to person, place, and time. She appears well-developed and well-nourished. HENT:   Head: Normocephalic and atraumatic. Right Ear: External ear normal.   Left Ear: External ear normal.   Mouth/Throat: Oropharynx is clear and moist.   Eyes: Conjunctivae and EOM are normal. Pupils are equal, round, and reactive to light. Neck: Normal range of motion. Neck supple. Cardiovascular: Normal rate, regular rhythm, normal heart sounds and intact distal pulses. Pulmonary/Chest: Effort normal and breath sounds normal.   Abdominal: Soft. Bowel sounds are normal.   Musculoskeletal: Normal range of motion. Neurological: She is alert and oriented to person, place, and time. She has normal reflexes. Skin: Skin is warm and dry. Psychiatric: She has a normal mood and affect.  Her behavior is normal. Judgment and thought content normal.       LABS:  CBC:   Lab Results   Component Value Date    WBC 3.6 08/11/2018    RBC 3.32 08/11/2018    HGB 8.6 08/11/2018    HCT 26.4 08/11/2018    MCV 79.6 08/11/2018    MCH 25.8 08/11/2018    MCHC 32.4 08/11/2018    RDW 22.4 08/11/2018    PLT 92 08/11/2018     CBC with Differential:    Lab Results   Component Value Date    WBC 3.6 08/11/2018    RBC 3.32 08/11/2018    HGB 8.6 08/11/2018    HCT 26.4 08/11/2018    PLT 92 08/11/2018    MCV 79.6 08/11/2018    MCH 25.8 08/11/2018    MCHC 32.4 08/11/2018    RDW 22.4 08/11/2018    BANDSPCT 3 08/10/2018    METASPCT 1 08/10/2018    LYMPHOPCT 19.0 08/11/2018    MONOPCT 19.7 08/11/2018    BASOPCT 1.1 08/11/2018    MONOSABS 0.7 08/11/2018    LYMPHSABS 0.7 08/11/2018    EOSABS 0.1 08/11/2018    BASOSABS 0.0 08/11/2018     CMP:    Lab Results   Component Value Date     08/11/2018    K 3.6 08/11/2018     08/11/2018    CO2 26 08/11/2018    BUN 10 08/11/2018    CREATININE 0.50 08/11/2018    GFRAA >60.0 08/11/2018    LABGLOM >60.0 08/11/2018    GLUCOSE 94 08/11/2018    PROT 6.6 08/09/2018    LABALBU 3.1 08/09/2018    CALCIUM 8.1 08/11/2018    BILITOT 2.2 08/09/2018    ALKPHOS 104 08/09/2018    AST 30 08/09/2018    ALT 8 08/09/2018     BMP:    Lab Results   Component Value Date     08/11/2018    K 3.6 08/11/2018     08/11/2018    CO2 26 08/11/2018    BUN 10 08/11/2018    LABALBU 3.1 08/09/2018    CREATININE 0.50 08/11/2018    CALCIUM 8.1 08/11/2018    GFRAA >60.0 08/11/2018    LABGLOM >60.0 08/11/2018    GLUCOSE 94 08/11/2018     Magnesium:    Lab Results   Component Value Date    MG 1.4 08/10/2018     Troponin:    Lab Results   Component Value Date    TROPONINI <0.010 08/09/2018             Assessment:    Active Hospital Problems    Diagnosis Date Noted    Sepsis ([facility] [address] [A41.9] 08/09/2018      urosepsis-improving  Hypokalemia-resolved  HTN-stable  SOB-resolved     Plan:  1.  Okay to discharge from cardiology standpoint  Electronically signed by Stevo Candelario MD on 8/11/2018 at 1:13 PM

## 2018-08-11 NOTE — DISCHARGE SUMMARY
daily             atorvastatin (LIPITOR) 20 MG tablet  Take 20 mg by mouth daily             docusate sodium (COLACE, DULCOLAX) 100 MG CAPS  Take 100 mg by mouth 2 times daily             isosorbide mononitrate (IMDUR) 30 MG extended release tablet  Take 30 mg by mouth daily             metoprolol succinate (TOPROL XL) 25 MG extended release tablet  Take 25 mg by mouth every 12 hours                 Disposition:   If discharged to Home, Any [facility] needs that were indicated and/or required as been addressed and set up by Social Work. Condition at discharge: Pt was medically stable at the time of discharge. Activity: activity as tolerated    Total time taken for discharging this patient: 40 minutes. Greater than 70% of time was spent focused exclusively on this patient. Time was taken to review chart, discuss plans with consultants, reconciling medications, discussing plan answering questions with patient.      Dickson Jennings  8/11/2018, 12:59 PM  ----------------------------------------------------------------------------------------------------------------------    Lily Aviles

## 2018-08-14 LAB
BLOOD CULTURE, ROUTINE: NORMAL
CULTURE, BLOOD 2: NORMAL

## 2019-01-01 ENCOUNTER — APPOINTMENT (OUTPATIENT)
Dept: GENERAL RADIOLOGY | Age: 84
End: 2019-01-01
Payer: MEDICARE

## 2019-01-01 ENCOUNTER — HOSPITAL ENCOUNTER (EMERGENCY)
Age: 84
Discharge: HOSPICE | End: 2019-09-13
Attending: EMERGENCY MEDICINE
Payer: MEDICARE

## 2019-01-01 VITALS
HEIGHT: 64 IN | OXYGEN SATURATION: 95 % | RESPIRATION RATE: 16 BRPM | SYSTOLIC BLOOD PRESSURE: 121 MMHG | HEART RATE: 77 BPM | DIASTOLIC BLOOD PRESSURE: 54 MMHG | WEIGHT: 140 LBS | BODY MASS INDEX: 23.9 KG/M2 | TEMPERATURE: 97.8 F

## 2019-01-01 DIAGNOSIS — R26.2 UNABLE TO AMBULATE: ICD-10-CM

## 2019-01-01 DIAGNOSIS — I50.9 ACUTE DECOMPENSATED HEART FAILURE (HCC): Primary | ICD-10-CM

## 2019-01-01 DIAGNOSIS — R53.1 GENERAL WEAKNESS: ICD-10-CM

## 2019-01-01 LAB
ALBUMIN SERPL-MCNC: 2.7 G/DL (ref 3.5–4.6)
ALP BLD-CCNC: 238 U/L (ref 40–130)
ALT SERPL-CCNC: 12 U/L (ref 0–33)
ANION GAP SERPL CALCULATED.3IONS-SCNC: 10 MEQ/L (ref 9–15)
AST SERPL-CCNC: 26 U/L (ref 0–35)
BILIRUB SERPL-MCNC: 2.5 MG/DL (ref 0.2–0.7)
BILIRUBIN URINE: NEGATIVE
BLOOD, URINE: NEGATIVE
BUN BLDV-MCNC: 10 MG/DL (ref 8–23)
CALCIUM SERPL-MCNC: 8.8 MG/DL (ref 8.5–9.9)
CHLORIDE BLD-SCNC: 102 MEQ/L (ref 95–107)
CLARITY: CLEAR
CO2: 32 MEQ/L (ref 20–31)
COLOR: YELLOW
CREAT SERPL-MCNC: 0.46 MG/DL (ref 0.5–0.9)
EKG ATRIAL RATE: 87 BPM
EKG P AXIS: 44 DEGREES
EKG P-R INTERVAL: 178 MS
EKG Q-T INTERVAL: 438 MS
EKG QRS DURATION: 128 MS
EKG QTC CALCULATION (BAZETT): 527 MS
EKG R AXIS: 17 DEGREES
EKG T AXIS: 177 DEGREES
EKG VENTRICULAR RATE: 87 BPM
GFR AFRICAN AMERICAN: >60
GFR NON-AFRICAN AMERICAN: >60
GLOBULIN: 3.7 G/DL (ref 2.3–3.5)
GLUCOSE BLD-MCNC: 84 MG/DL (ref 70–99)
GLUCOSE URINE: NEGATIVE MG/DL
HCT VFR BLD CALC: 38.4 % (ref 37–47)
HEMOGLOBIN: 12.3 G/DL (ref 12–16)
KETONES, URINE: NEGATIVE MG/DL
LEUKOCYTE ESTERASE, URINE: NEGATIVE
MAGNESIUM: 1.6 MG/DL (ref 1.7–2.4)
MCH RBC QN AUTO: 31 PG (ref 27–31.3)
MCHC RBC AUTO-ENTMCNC: 31.9 % (ref 33–37)
MCV RBC AUTO: 96.9 FL (ref 82–100)
NITRITE, URINE: NEGATIVE
PDW BLD-RTO: 17.8 % (ref 11.5–14.5)
PH UA: 6.5 (ref 5–9)
PLATELET # BLD: 164 K/UL (ref 130–400)
POTASSIUM SERPL-SCNC: 3.2 MEQ/L (ref 3.4–4.9)
PRO-BNP: 742 PG/ML
PROTEIN UA: NEGATIVE MG/DL
RBC # BLD: 3.96 M/UL (ref 4.2–5.4)
SODIUM BLD-SCNC: 144 MEQ/L (ref 135–144)
SPECIFIC GRAVITY UA: 1.01 (ref 1–1.03)
TOTAL PROTEIN: 6.4 G/DL (ref 6.3–8)
TROPONIN: <0.01 NG/ML (ref 0–0.01)
TSH SERPL DL<=0.05 MIU/L-ACNC: 4.77 UIU/ML (ref 0.44–3.86)
URINE REFLEX TO CULTURE: NORMAL
UROBILINOGEN, URINE: 1 E.U./DL
WBC # BLD: 3.7 K/UL (ref 4.8–10.8)

## 2019-01-01 PROCEDURE — 81003 URINALYSIS AUTO W/O SCOPE: CPT

## 2019-01-01 PROCEDURE — 80053 COMPREHEN METABOLIC PANEL: CPT

## 2019-01-01 PROCEDURE — 6360000002 HC RX W HCPCS: Performed by: EMERGENCY MEDICINE

## 2019-01-01 PROCEDURE — 96374 THER/PROPH/DIAG INJ IV PUSH: CPT

## 2019-01-01 PROCEDURE — 84484 ASSAY OF TROPONIN QUANT: CPT

## 2019-01-01 PROCEDURE — 83735 ASSAY OF MAGNESIUM: CPT

## 2019-01-01 PROCEDURE — 83880 ASSAY OF NATRIURETIC PEPTIDE: CPT

## 2019-01-01 PROCEDURE — 84443 ASSAY THYROID STIM HORMONE: CPT

## 2019-01-01 PROCEDURE — 99285 EMERGENCY DEPT VISIT HI MDM: CPT

## 2019-01-01 PROCEDURE — 36415 COLL VENOUS BLD VENIPUNCTURE: CPT

## 2019-01-01 PROCEDURE — 87040 BLOOD CULTURE FOR BACTERIA: CPT

## 2019-01-01 PROCEDURE — 85027 COMPLETE CBC AUTOMATED: CPT

## 2019-01-01 PROCEDURE — 71045 X-RAY EXAM CHEST 1 VIEW: CPT

## 2019-01-01 PROCEDURE — 93005 ELECTROCARDIOGRAM TRACING: CPT | Performed by: EMERGENCY MEDICINE

## 2019-01-01 RX ORDER — FUROSEMIDE 10 MG/ML
20 INJECTION INTRAMUSCULAR; INTRAVENOUS ONCE
Status: COMPLETED | OUTPATIENT
Start: 2019-01-01 | End: 2019-01-01

## 2019-01-01 RX ADMIN — FUROSEMIDE 20 MG: 10 INJECTION, SOLUTION INTRAVENOUS at 08:43

## 2019-01-01 ASSESSMENT — ENCOUNTER SYMPTOMS
WHEEZING: 0
CHEST TIGHTNESS: 0
ABDOMINAL PAIN: 0
VOMITING: 0
DIARRHEA: 0
SHORTNESS OF BREATH: 1
EYE DISCHARGE: 0
PHOTOPHOBIA: 0
FACIAL SWELLING: 0
CONSTIPATION: 0
COLOR CHANGE: 0
ABDOMINAL DISTENTION: 0
RHINORRHEA: 0

## 2019-01-01 ASSESSMENT — PAIN DESCRIPTION - PAIN TYPE: TYPE: ACUTE PAIN

## 2019-01-01 ASSESSMENT — PAIN SCALES - GENERAL: PAINLEVEL_OUTOF10: 2

## 2019-01-01 ASSESSMENT — PAIN DESCRIPTION - DESCRIPTORS: DESCRIPTORS: ACHING;THROBBING

## 2019-01-01 ASSESSMENT — PAIN DESCRIPTION - ORIENTATION: ORIENTATION: RIGHT;LEFT

## 2019-01-01 ASSESSMENT — PAIN DESCRIPTION - LOCATION: LOCATION: LEG

## 2019-01-01 ASSESSMENT — PAIN DESCRIPTION - FREQUENCY: FREQUENCY: CONTINUOUS

## 2019-09-13 NOTE — ED PROVIDER NOTES
[facility] [facility] ED  eMERGENCY dEPARTMENT eNCOUnter      Pt Name: Elenore Collet  MRN: [id number]  Armsdanilegflaura 1935  Date of evaluation: 9/13/2019  Provider: Madan Lyons, [address] [place] [address]       Chief Complaint   Patient presents with    Extremity Weakness     LOWER LEGS         HISTORY OF PRESENT ILLNESS   (Location/Symptom, Timing/Onset,Context/Setting, Quality, Duration, Modifying Factors, Severity)  Note limiting factors. Elenore Collet is a 81 y.o. female who presents to the emergency department with a chief complaint of not being able to get out of bed this morning. Patient has been slowly declining in health since a traumatic injury year ago where she fell and broke her sternum and some ribs. She opted not to have any medical care at that time and stated to her  \"I just want to go home and die\". He took her home and has been able to care for her there however she is slowly become more debilitated over time in the last 3 months she has had a significant increase in her decline rapidly. Her lower extremities have been more swollen, she has been confused off and on, at times not recognizing him or hallucinating that there are kittens in the room. She had been able to get up to go to the bathroom at least, as of this morning she was unable to get out of bed. She has been more short of breath than usual and lower in energy. Patient's  states that he cannot help her enough at home and he believes they are at a point where she is unsafe. The patient has no complaints of pain currently. HPI    NursingNotes were reviewed. REVIEW OF SYSTEMS    (2-9 systems for level 4, 10 or more for level 5)     Review of Systems   Constitutional: Positive for activity change, appetite change and fatigue. Negative for fever. HENT: Negative for congestion, facial swelling and rhinorrhea. Eyes: Negative for photophobia and discharge. Respiratory: Positive for shortness of breath.

## 2019-09-18 LAB
BLOOD CULTURE, ROUTINE: NORMAL
CULTURE, BLOOD 2: NORMAL

## 2022-06-04 ENCOUNTER — TELEPHONE ENCOUNTER (OUTPATIENT)
Dept: URBAN - METROPOLITAN AREA CLINIC 68 | Facility: CLINIC | Age: 87
End: 2022-06-04

## 2022-06-04 RX ORDER — METHYLCELLULOSE 500 MG/1
TABLET ORAL DAILY
Qty: 30 | Refills: 0 | OUTPATIENT
Start: 2015-04-08 | End: 2015-05-08

## 2022-06-05 ENCOUNTER — TELEPHONE ENCOUNTER (OUTPATIENT)
Dept: URBAN - METROPOLITAN AREA CLINIC 68 | Facility: CLINIC | Age: 87
End: 2022-06-05

## 2022-06-05 RX ORDER — FUROSEMIDE 40 MG/1
FUROSEMIDE( 40MG ORAL  DAILY ) ACTIVE -HX ENTRY TABLET ORAL DAILY
Status: ACTIVE | COMMUNITY
Start: 2015-04-08

## 2022-06-25 ENCOUNTER — TELEPHONE ENCOUNTER (OUTPATIENT)
Age: 87
End: 2022-06-25

## 2022-06-25 RX ORDER — METHYLCELLULOSE 500 MG/1
TABLET ORAL DAILY
Qty: 30 | Refills: 0 | OUTPATIENT
Start: 2015-04-08 | End: 2015-05-08

## 2022-06-26 ENCOUNTER — TELEPHONE ENCOUNTER (OUTPATIENT)
Age: 87
End: 2022-06-26

## 2022-06-26 RX ORDER — FUROSEMIDE 40 MG/1
FUROSEMIDE( 40MG ORAL  DAILY ) ACTIVE -HX ENTRY TABLET ORAL DAILY
Status: ACTIVE | COMMUNITY
Start: 2015-04-08

## (undated) DEVICE — LUBRICANT SURG JELLY ST BACTER TUBE 4.25OZ

## (undated) DEVICE — TUBE SET 96 MM 64 MM H2O PERISTALTIC STD AUX CHANNEL

## (undated) DEVICE — Device: Brand: ENDO SMARTCAP

## (undated) DEVICE — ADAPTER FLSH PMP FLD MGMT GI IRRIG OFP 2 DISPOSABLE

## (undated) DEVICE — SONY PRINTER PAPER

## (undated) DEVICE — FORCEPS ENDOSCP BX OVL CUP SERR W/NEEDLE 2.3MM DIA 160CML

## (undated) DEVICE — THE DISPOSABLE RAPTOR GRASPING DEVICE IS USED TO GRASP TISSUE AND/OR RETRIEVE FOREIGN BODIES, EXCISED TISSUE AND STENTS DURING ENDOSCOPIC PROCEDURES.: Brand: RAPTOR

## (undated) DEVICE — ENDO CARRY-ON PROCEDURE KIT: Brand: ENDO CARRY-ON PROCEDURE KIT

## (undated) DEVICE — CONMED SCOPE SAVER BITE BLOCK, 20X27 MM: Brand: SCOPE SAVER

## (undated) DEVICE — GLOVE SURG SZ 85 STD WHT LTX SYN POLYMER BEAD REINF ANTI RL